# Patient Record
Sex: MALE | Race: WHITE | NOT HISPANIC OR LATINO | Employment: FULL TIME | URBAN - METROPOLITAN AREA
[De-identification: names, ages, dates, MRNs, and addresses within clinical notes are randomized per-mention and may not be internally consistent; named-entity substitution may affect disease eponyms.]

---

## 2017-01-06 ENCOUNTER — ALLSCRIPTS OFFICE VISIT (OUTPATIENT)
Dept: OTHER | Facility: OTHER | Age: 22
End: 2017-01-06

## 2017-04-04 ENCOUNTER — ALLSCRIPTS OFFICE VISIT (OUTPATIENT)
Dept: OTHER | Facility: OTHER | Age: 22
End: 2017-04-04

## 2017-04-12 ENCOUNTER — GENERIC CONVERSION - ENCOUNTER (OUTPATIENT)
Dept: OTHER | Facility: OTHER | Age: 22
End: 2017-04-12

## 2017-04-12 LAB
A/G RATIO (HISTORICAL): 1.8 (CALC) (ref 1–2.5)
ALBUMIN SERPL BCP-MCNC: 5 G/DL (ref 3.6–5.1)
ALP SERPL-CCNC: 66 U/L (ref 40–115)
ALT SERPL W P-5'-P-CCNC: 15 U/L (ref 9–46)
AST SERPL W P-5'-P-CCNC: 19 U/L (ref 10–40)
BASOPHILS # BLD AUTO: 0.2 %
BASOPHILS # BLD AUTO: 11 CELLS/UL (ref 0–200)
BILIRUB SERPL-MCNC: 0.5 MG/DL (ref 0.2–1.2)
BUN SERPL-MCNC: 12 MG/DL (ref 7–25)
BUN/CREA RATIO (HISTORICAL): NORMAL (CALC) (ref 6–22)
CALCIUM SERPL-MCNC: 9.8 MG/DL (ref 8.6–10.3)
CHLORIDE SERPL-SCNC: 102 MMOL/L (ref 98–110)
CHOLEST SERPL-MCNC: 144 MG/DL (ref 125–200)
CHOLEST/HDLC SERPL: 4 (CALC)
CO2 SERPL-SCNC: 29 MMOL/L (ref 20–31)
CREAT SERPL-MCNC: 0.96 MG/DL (ref 0.6–1.35)
DEPRECATED RDW RBC AUTO: 12.6 % (ref 11–15)
EOSINOPHIL # BLD AUTO: 0.7 %
EOSINOPHIL # BLD AUTO: 37 CELLS/UL (ref 15–500)
GAMMA GLOBULIN (HISTORICAL): 2.8 G/DL (CALC) (ref 1.9–3.7)
GLUCOSE (HISTORICAL): 85 MG/DL (ref 65–99)
HCT VFR BLD AUTO: 43.8 % (ref 38.5–50)
HDLC SERPL-MCNC: 36 MG/DL
HGB BLD-MCNC: 14.5 G/DL (ref 13.2–17.1)
LDL CHOLESTEROL DIRECT (HISTORICAL): 103 MG/DL
LYMPHOCYTES # BLD AUTO: 1134 CELLS/UL (ref 850–3900)
LYMPHOCYTES # BLD AUTO: 21.4 %
MCH RBC QN AUTO: 30.4 PG (ref 27–33)
MCHC RBC AUTO-ENTMCNC: 33 G/DL (ref 32–36)
MCV RBC AUTO: 92 FL (ref 80–100)
MONOCYTES # BLD AUTO: 355 CELLS/UL (ref 200–950)
MONOCYTES (HISTORICAL): 6.7 %
NEUTROPHILS # BLD AUTO: 3763 CELLS/UL (ref 1500–7800)
NEUTROPHILS # BLD AUTO: 71 %
NON-HDL-CHOL (CHOL-HDL) (HISTORICAL): 108 MG/DL (CALC)
PLATELET # BLD AUTO: 248 THOUSAND/UL (ref 140–400)
PMV BLD AUTO: 8.9 FL (ref 7.5–12.5)
POTASSIUM SERPL-SCNC: 4.4 MMOL/L (ref 3.5–5.3)
RBC # BLD AUTO: 4.76 MILLION/UL (ref 4.2–5.8)
SODIUM SERPL-SCNC: 138 MMOL/L (ref 135–146)
TOTAL PROTEIN (HISTORICAL): 7.8 G/DL (ref 6.1–8.1)
TRIGL SERPL-MCNC: 63 MG/DL
TSH SERPL DL<=0.05 MIU/L-ACNC: 1.61 MIU/L (ref 0.4–4.5)
WBC # BLD AUTO: 5.3 THOUSAND/UL (ref 3.8–10.8)

## 2017-11-28 ENCOUNTER — ALLSCRIPTS OFFICE VISIT (OUTPATIENT)
Dept: OTHER | Facility: OTHER | Age: 22
End: 2017-11-28

## 2018-01-14 VITALS
HEIGHT: 69 IN | TEMPERATURE: 98.6 F | OXYGEN SATURATION: 97 % | WEIGHT: 210.01 LBS | BODY MASS INDEX: 31.11 KG/M2 | RESPIRATION RATE: 16 BRPM | SYSTOLIC BLOOD PRESSURE: 122 MMHG | HEART RATE: 83 BPM | DIASTOLIC BLOOD PRESSURE: 74 MMHG

## 2018-01-15 VITALS
BODY MASS INDEX: 30.59 KG/M2 | WEIGHT: 206.5 LBS | TEMPERATURE: 99 F | DIASTOLIC BLOOD PRESSURE: 72 MMHG | HEIGHT: 69 IN | HEART RATE: 100 BPM | SYSTOLIC BLOOD PRESSURE: 124 MMHG | OXYGEN SATURATION: 96 %

## 2018-01-15 NOTE — RESULT NOTES
Verified Results  (Q) CBC (INCLUDES DIFF/PLT) (REFL) [id number] 12:19PM [employer]     Test Name Result Flag Reference   WHITE BLOOD CELL COUNT 5 3 Thousand/uL  3 8-10 8   RED BLOOD CELL COUNT 4 76 Million/uL  4 20-5 80   HEMOGLOBIN 14 5 g/dL  13 2-17 1   HEMATOCRIT 43 8 %  38 5-50 0   MCV 92 0 fL  80 0-100 0   MCH 30 4 pg  27 0-33 0   MCHC 33 0 g/dL  32 0-36 0   RDW 12 6 %  11 0-15 0   PLATELET COUNT 400 Thousand/uL  140-400   MPV 8 9 fL  7 5-12 5   ABSOLUTE NEUTROPHILS 3763 cells/uL  5602-2551   ABSOLUTE LYMPHOCYTES 1134 cells/uL  850-3900   ABSOLUTE MONOCYTES 355 cells/uL  200-950   ABSOLUTE EOSINOPHILS 37 cells/uL     ABSOLUTE BASOPHILS 11 cells/uL  0-200   NEUTROPHILS 71 0 %     LYMPHOCYTES 21 4 %     MONOCYTES 6 7 %     EOSINOPHILS 0 7 %     BASOPHILS 0 2 %       (Q) COMPREHENSIVE METABOLIC PANEL W/O eGFR [id number] 12:19PM [employer]     Test Name Result Flag Reference   GLUCOSE 85 mg/dL  65-99   Fasting reference interval   UREA NITROGEN (BUN) 12 mg/dL  7-25   CREATININE 0 96 mg/dL  0 60-1 35   BUN/CREATININE RATIO   3-01   NOT APPLICABLE (calc)   SODIUM 138 mmol/L  135-146   POTASSIUM 4 4 mmol/L  3 5-5 3   CHLORIDE 102 mmol/L     CARBON DIOXIDE 29 mmol/L  20-31   CALCIUM 9 8 mg/dL  8 6-10 3   PROTEIN, TOTAL 7 8 g/dL  6 1-8 1   ALBUMIN 5 0 g/dL  3 6-5 1   GLOBULIN 2 8 g/dL (calc)  1 9-3 7   ALBUMIN/GLOBULIN RATIO 1 8 (calc)  1 0-2 5   BILIRUBIN, TOTAL 0 5 mg/dL  0 2-1 2   ALKALINE PHOSPHATASE 66 U/L     AST 19 U/L  10-40   ALT 15 U/L  9-46     (Q) LIPID PANEL WITH DIRECT LDL [id number] 12:19PM [employer]     Test Name Result Flag Reference   CHOLESTEROL, TOTAL 144 mg/dL  125-200   HDL CHOLESTEROL 36 mg/dL L > OR = 40   TRIGLICERIDES 63 mg/dL  <619   DIRECT  mg/dL  <130   Desirable range <100 mg/dL for patients with CHD or  diabetes and <70 mg/dL for diabetic patients with  known heart disease     CHOL/HDLC RATIO 4 0 (calc)  < OR = 5 0   NON HDL CHOLESTEROL 108 mg/dL (calc)     Target for non-HDL cholesterol is 30 mg/dL higher than   LDL cholesterol target       (Q) TSH, 3RD GENERATION W/REFLEX TO FT4 [id number] 12:19PM [facility]   REPORT COMMENT:  FASTING:YES     Test Name Result Flag Reference   TSH W/REFLEX TO FT4 1 61 mIU/L  0 40-4 50

## 2018-01-17 NOTE — PROGRESS NOTES
Assessment    1  Encounter for preventive health examination (V70 0) (Z00 00)    Plan  PMH: History of influenza vaccination    · Fluzone Quadrivalent 0 5 ML Intramuscular Suspension Prefilled Syringe    Discussion/Summary  Impression: health maintenance visit  Currently, he eats an adequate diet  Prostate cancer screening: prostate cancer screening is managed by  Testicular cancer screening: testicular cancer screening is managed by  Colorectal cancer screening: colorectal cancer screening is not indicated  Advice and education were given regarding nutrition, weight loss and sunscreen use  F/U ONE YEAR  The treatment plan was reviewed with the patient/guardian  The patient/guardian understands and agrees with the treatment plan      Chief Complaint  WELLNESS      History of Present Illness  HM, Adult Male: The patient is being seen for a health maintenance evaluation  The last health maintenance visit was 1 year(s) ago  Social History: Household members include domestic partner, mother, father and 1 GRANDMOTHER  He is unmarried  Work status: working full time  The patient has never smoked cigarettes  He reports never drinking alcohol  He has never used illicit drugs  General Health: The patient's health since the last visit is described as good  He does not have regular dental visits  The patient brushes 1 time(s) a day, flosses occasionally and hasn't had a dental visit  Dental problems: no tooth pain and no caries  He denies vision problems  Vision care includes no recent eye examination  He denies hearing loss  Hearing is normal   He doesn't wear a hearing aid  Immunizations status: up to date  Lifestyle:  He does not have a healthy diet  He does not have any weight concerns  He exercises regularly  He exercises 6-7 times per week for 30-60 minutes per session  Exercise includes walking and aerobic conditioning  He does not use tobacco  The patient has never smoked cigarettes   He denies alcohol use  He reports never drinking alcohol  Alcohol concern: The patient has no concerns about alcohol abuse  He denies drug use  He has never used illicit drugs  Reproductive health:  the patient is sexually active  He is monogamous with a female partner  birth control is being practiced (his partner uses hormonal birth control )  Screening: Prostate cancer screening includes no previous evaluation  Testicular cancer screening includes no previous evaluation  Colorectal cancer screening includes no previous screening  Metabolic screening includes lipid profile performed 2017, glucose screening performed 2017, thyroid function test performed 2017 and no previous DEXA  Safety elements used: seat belt and smoke detector  HPI: HERE FOR WELLNESS EXAM       Review of Systems    Constitutional: No fever or chills, feels well, no tiredness, no recent weight gain or weight loss  Eyes: No complaints of eye pain, no red eyes, no discharge from eyes, no itchy eyes  ENT: no complaints of earache, no hearing loss, no nosebleeds, no nasal discharge, no sore throat, no hoarseness  Cardiovascular: No complaints of slow heart rate, no fast heart rate, no chest pain, no palpitations, no leg claudication, no lower extremity  Respiratory: No complaints of shortness of breath, no wheezing, no cough, no SOB on exertion, no orthopnea or PND  Gastrointestinal: No complaints of abdominal pain, no constipation, no nausea or vomiting, no diarrhea or bloody stools  Genitourinary: No complaints of dysuria, no incontinence, no hesitancy, no nocturia, no genital lesion, no testicular pain  Musculoskeletal: No complaints of arthralgia, no myalgias, no joint swelling or stiffness, no limb pain or swelling  Integumentary: No complaints of skin rash or skin lesions, no itching, no skin wound, no dry skin     Neurological: No compliants of headache, no confusion, no convulsions, no numbness or tingling, no dizziness or fainting, no limb weakness, no difficulty walking  Psychiatric: Is not suicidal, no sleep disturbances, no anxiety or depression, no change in personality, no emotional problems  Endocrine: No complaints of proptosis, no hot flashes, no muscle weakness, no erectile dysfunction, no deepening of the voice, no feelings of weakness  Hematologic/Lymphatic: No complaints of swollen glands, no swollen glands in the neck, does not bleed easily, no easy bruising  Active Problems    1  Allergic rhinitis (477 9) (J30 9)   2  Generalized anxiety disorder (300 02) (F41 1)    Past Medical History    · History of Irregular heartbeat (427 9) (I49 9)    Surgical History    · Denied: History Of Prior Surgery    Family History  Mother    · Family history of Anxiety   · Family history of Hysterectomy  Maternal Grandmother    · Family history of Coronary artery disease    Social History    · No alcohol use   · Non smoker (V49 89) (Z78 9)    Current Meds   1  Amoxicillin-Pot Clavulanate 875-125 MG Oral Tablet; TAKE 1 TABLET EVERY 12   HOURS DAILY; Therapy: 05UCZ8549 to (Evaluate:13Jan2017)  Requested for: 15HXB4424; Last   Rx:06Jan2017 Ordered   2  Cetirizine HCl - 5 MG Oral Tablet; TAKE 1 TABLET DAILY; Therapy: 88MMT1036 to (Evaluate:14Uhf5070)  Requested for: 16JIJ6232; Last   Rx:10May2017 Ordered   3  Fexofenadine HCl - 180 MG Oral Tablet; TAKE 1 TABLET DAILY; Therapy: 40Oct6013 to (Last Rx:72Exl8456)  Requested for: 82Ggo6823 Ordered   4  Levocetirizine Dihydrochloride 5 MG Oral Tablet; TAKE 1 TABLET DAILY; Therapy: 84FMS2551 to (Evaluate:12May2017)  Requested for: 12Apr2017; Last   Rx:12Apr2017 Ordered   5  SUMAtriptan Succinate 100 MG Oral Tablet; TAKE 1 TABLET AT ONSET OF MIGRAINE   HEADACHE  MAY REPEAT IN 2 HOURS IF NEEDED; Therapy: 11UBV3747 to (Evaluate:12May2017)  Requested for: 12Apr2017; Last   Rx:12Apr2017 Ordered    Allergies    1   No Known Drug Allergies    Vitals   Recorded: 26SAC3219 03:08PM   Temperature 98 2 F   Heart Rate 79   Respiration 20   Systolic 821   Diastolic 78   Height 5 ft 9 in   Weight 212 lb    BMI Calculated 31 31   BSA Calculated 2 12   O2 Saturation 98     Physical Exam    Constitutional   General appearance: No acute distress, well appearing and well nourished  Head and Face   Head and face: Normal     Eyes   Conjunctiva and lids: No erythema, swelling or discharge  Pupils and irises: Equal, round, reactive to light  Ears, Nose, Mouth, and Throat   External inspection of ears and nose: Normal     Otoscopic examination: Tympanic membranes translucent with normal light reflex  Canals patent without erythema  Nasal mucosa, septum, and turbinates: Normal without edema or erythema  Lips, teeth, and gums: Normal, good dentition  Oropharynx: Normal with no erythema, edema, exudate or lesions  Neck   Neck: Supple, symmetric, trachea midline, no masses  Thyroid: Normal, no thyromegaly  Pulmonary   Respiratory effort: No increased work of breathing or signs of respiratory distress  Auscultation of lungs: Clear to auscultation  Cardiovascular   Auscultation of heart: Normal rate and rhythm, normal S1 and S2, no murmurs  Examination of extremities for edema and/or varicosities: Normal     Chest   Chest: Normal     Abdomen   Abdomen: Non-tender, no masses  Liver and spleen: No hepatomegaly or splenomegaly  Examination for hernias: No hernias appreciated  Lymphatic   Palpation of lymph nodes in neck: No lymphadenopathy  Musculoskeletal   Gait and station: Normal     Inspection/palpation of digits and nails: Normal without clubbing or cyanosis  Inspection/palpation of joints, bones, and muscles: Normal     Range of motion: Normal     Stability: Normal     Muscle strength/tone: Normal     Skin   Skin and subcutaneous tissue: Normal without rashes or lesions  Palpation of skin and subcutaneous tissue: Normal turgor      Neurologic   Cranial nerves: Cranial nerves 2-12 intact  Reflexes: 2+ and symmetric  Sensation: No sensory loss  Psychiatric   Judgment and insight: Normal     Orientation to person, place and time: Normal     Recent and remote memory: Intact  Mood and affect: Normal        Results/Data  PHQ-2 Adult Depression Screening 28Nov2017 03:10PM User, [employer]     Test Name Result Flag Reference   PHQ-2 Adult Depression Score 0     Over the last two weeks, how often have you been bothered by any of the following problems?   Little interest or pleasure in doing things: Not at all - 0  Feeling down, depressed, or hopeless: Not at all - 0   PHQ-2 Adult Depression Screening Negative         Signatures   Electronically signed by : HEMANTH Mary; Nov 30 2017  6:52AM EST                       (Author)    Electronically signed by : MINESH Saucedo ; Nov 30 2017  8:28AM EST                       (Review)

## 2018-01-18 NOTE — PROGRESS NOTES
Assessment    1  Encounter for preventive health examination (V70 0) (Z00 00)   2  Screening, lipid (V77 91) (Z13 220)   3  Screening for thyroid disorder (V77 0) (Z13 29)    Plan  Health Maintenance, Generalized anxiety disorder, Screening for diabetes mellitus,  Screening for thyroid disorder, Screening, lipid    · (1) CBC/PLT/DIFF; Status:Active; Requested for:22Nov2016;    · (1) COMPREHENSIVE METABOLIC PANEL; Status:Active; Requested for:22Nov2016;    · (1) LIPID PANEL FASTING W DIRECT LDL REFLEX; Status:Active; Requested  for:22Nov2016;    · (1) TSH WITH FT4 REFLEX; Status:Active; Requested for:22Nov2016;   Need for influenza vaccination    · Fluzone Quadrivalent 0 5 ML Intramuscular Suspension Prefilled Syringe    Discussion/Summary  Impression: health maintenance visit  Currently, he eats a healthy diet  Prostate cancer screening: PSA is not indicated  Colorectal cancer screening: colorectal cancer screening is not indicated  The immunizations will be given as outlined in the orders  Advice and education were given regarding nutrition  Patient discussion: discussed with the patient  FLU VACCINE  LABS   F/U YEARLY  Chief Complaint  Patient presents today for a wellness      History of Present Illness  HM, Adult Male: The patient is being seen for a health maintenance evaluation  Social History: Household members include GIRLFRIEND  He is unmarried  Work status: working full time and occupation: [employer]   The patient has never smoked cigarettes  He reports never drinking alcohol  He has never used illicit drugs  General Health: The patient's health since the last visit is described as good  He does not have regular dental visits  He denies vision problems  He denies hearing loss  Immunizations status: not up to date  Lifestyle:  He consumes a diverse and healthy diet  He does not have any weight concerns  He exercises regularly  He does not use tobacco  He denies alcohol use   He denies drug use    Reproductive health:  the patient is sexually active  birth control is being practiced  Screening: Testicular cancer screening includes no previous evaluation  Colorectal cancer screening includes no previous screening  Metabolic screening includes uncertain timing of his last lipid profile, uncertain timing of his last glucose screening, uncertain timing of his last thyroid function test and no previous DEXA  Safety elements used: seat belt and smoke detector  HPI: [address]       Review of Systems    Constitutional: No fever or chills, feels well, no tiredness, no recent weight gain or weight loss  Eyes: No complaints of eye pain, no red eyes, no discharge from eyes, no itchy eyes  ENT: no complaints of earache, no hearing loss, no nosebleeds, no nasal discharge, no sore throat, no hoarseness  Cardiovascular: No complaints of slow heart rate, no fast heart rate, no chest pain, no palpitations, no leg claudication, no lower extremity  Respiratory: No complaints of shortness of breath, no wheezing, no cough, no SOB on exertion, no orthopnea or PND  Gastrointestinal: No complaints of abdominal pain, no constipation, no nausea or vomiting, no diarrhea or bloody stools  Genitourinary: No complaints of dysuria, no incontinence, no hesitancy, no nocturia, no genital lesion, no testicular pain  Musculoskeletal: No complaints of arthralgia, no myalgias, no joint swelling or stiffness, no limb pain or swelling  Integumentary: No complaints of skin rash or skin lesions, no itching, no skin wound, no dry skin  Neurological: No compliants of headache, no confusion, no convulsions, no numbness or tingling, no dizziness or fainting, no limb weakness, no difficulty walking  Psychiatric: Is not suicidal, no sleep disturbances, no anxiety or depression, no change in personality, no emotional problems     Endocrine: No complaints of proptosis, no hot flashes, no muscle weakness, no erectile dysfunction, no deepening of the voice, no feelings of weakness  Hematologic/Lymphatic: No complaints of swollen glands, no swollen glands in the neck, does not bleed easily, no easy bruising  Active Problems    1  Generalized anxiety disorder (300 02) (F41 1)   2  Need for influenza vaccination (V04 81) (Z23)    Past Medical History    · History of Irregular heartbeat (427 9) (I49 9)    Surgical History    · Denied: History Of Prior Surgery    Family History  Mother    · Family history of Anxiety   · Family history of Hysterectomy  Maternal Grandmother    · Family history of Coronary artery disease    Social History    · No alcohol use   · Non smoker (V49 89) (Z78 9)    Current Meds   1  Fexofenadine HCl - 180 MG Oral Tablet; TAKE 1 TABLET DAILY; Therapy: 11Fko2126 to (Last Rx:42Bnf6051)  Requested for: 30Iln9418 Ordered    Allergies    1  No Known Drug Allergies    Vitals   Recorded: 22Nov2016 05:38PM   Temperature 98 6 F   Heart Rate 73   Respiration 16   Systolic 988   Diastolic 72   Height 5 ft 9 in   Weight 200 lb 0 8 oz   BMI Calculated 29 54   BSA Calculated 2 07   O2 Saturation 96     Physical Exam    Constitutional   General appearance: No acute distress, well appearing and well nourished  Head and Face   Head and face: Normal     Eyes   Conjunctiva and lids: No erythema, swelling or discharge  Pupils and irises: Equal, round, reactive to light  Ears, Nose, Mouth, and Throat   External inspection of ears and nose: Normal     Otoscopic examination: Tympanic membranes translucent with normal light reflex  Canals patent without erythema  Nasal mucosa, septum, and turbinates: Normal without edema or erythema  Lips, teeth, and gums: Normal, good dentition  Oropharynx: Normal with no erythema, edema, exudate or lesions  Neck   Neck: Supple, symmetric, trachea midline, no masses  Thyroid: Normal, no thyromegaly      Pulmonary   Respiratory effort: No increased work of breathing or signs of respiratory distress  Auscultation of lungs: Clear to auscultation  Cardiovascular   Auscultation of heart: Normal rate and rhythm, normal S1 and S2, no murmurs  Examination of extremities for edema and/or varicosities: Normal     Abdomen   Abdomen: Non-tender, no masses  Liver and spleen: No hepatomegaly or splenomegaly  Lymphatic   Palpation of lymph nodes in neck: No lymphadenopathy  Musculoskeletal   Gait and station: Normal     Inspection/palpation of digits and nails: Normal without clubbing or cyanosis  Inspection/palpation of joints, bones, and muscles: Normal     Range of motion: Normal     Stability: Normal     Muscle strength/tone: Normal     Skin   Skin and subcutaneous tissue: Normal without rashes or lesions  Palpation of skin and subcutaneous tissue: Normal turgor  Neurologic   Cranial nerves: Cranial nerves 2-12 intact  Cortical function: Normal mental status  Reflexes: 2+ and symmetric  Sensation: No sensory loss  Coordination: Normal finger to nose and heel to shin  Psychiatric   Judgment and insight: Normal     Orientation to person, place and time: Normal     Recent and remote memory: Intact      Mood and affect: Normal        Future Appointments    Date/Time Provider Specialty Site   11/28/2017 03:00 PM Donnie Oneil [facility]     Signatures   Electronically signed by : Donnie Porras; Nov 24 2016  8:47PM EST                       (Author)    Electronically signed by : MINESH Byrnes ; Nov 24 2016 11:09PM EST                       (Review)

## 2018-01-22 VITALS
WEIGHT: 212 LBS | TEMPERATURE: 98.2 F | DIASTOLIC BLOOD PRESSURE: 78 MMHG | BODY MASS INDEX: 31.4 KG/M2 | RESPIRATION RATE: 20 BRPM | HEART RATE: 79 BPM | SYSTOLIC BLOOD PRESSURE: 118 MMHG | OXYGEN SATURATION: 98 % | HEIGHT: 69 IN

## 2018-06-27 ENCOUNTER — OFFICE VISIT (OUTPATIENT)
Dept: INTERNAL MEDICINE CLINIC | Facility: CLINIC | Age: 23
End: 2018-06-27

## 2018-06-27 VITALS
BODY MASS INDEX: 31.5 KG/M2 | DIASTOLIC BLOOD PRESSURE: 90 MMHG | HEIGHT: 70 IN | HEART RATE: 86 BPM | OXYGEN SATURATION: 99 % | WEIGHT: 220 LBS | SYSTOLIC BLOOD PRESSURE: 130 MMHG

## 2018-06-27 DIAGNOSIS — F32.A DEPRESSION, UNSPECIFIED DEPRESSION TYPE: Primary | ICD-10-CM

## 2018-06-27 DIAGNOSIS — F41.1 GENERALIZED ANXIETY DISORDER: Primary | ICD-10-CM

## 2018-06-27 PROCEDURE — 99213 OFFICE O/P EST LOW 20 MIN: CPT | Performed by: NURSE PRACTITIONER

## 2018-06-27 RX ORDER — ALBUTEROL SULFATE 90 UG/1
AEROSOL, METERED RESPIRATORY (INHALATION)
COMMUNITY
Start: 2014-02-11 | End: 2022-01-31 | Stop reason: ALTCHOICE

## 2018-06-27 RX ORDER — SUMATRIPTAN 100 MG/1
1 TABLET, FILM COATED ORAL
COMMUNITY
Start: 2017-04-04 | End: 2021-04-12 | Stop reason: HOSPADM

## 2018-06-27 RX ORDER — AMOXICILLIN AND CLAVULANATE POTASSIUM 875; 125 MG/1; MG/1
1 TABLET, FILM COATED ORAL EVERY 12 HOURS
COMMUNITY
Start: 2017-01-06 | End: 2018-06-27 | Stop reason: ALTCHOICE

## 2018-06-27 RX ORDER — LORATADINE AND PSEUDOEPHEDRINE 10; 240 MG/1; MG/1
TABLET, EXTENDED RELEASE ORAL EVERY 24 HOURS
COMMUNITY
Start: 2014-02-12 | End: 2018-06-27 | Stop reason: SDUPTHER

## 2018-06-27 RX ORDER — LEVOCETIRIZINE DIHYDROCHLORIDE 5 MG/1
1 TABLET, FILM COATED ORAL DAILY
COMMUNITY
Start: 2017-04-04 | End: 2022-01-31 | Stop reason: ALTCHOICE

## 2018-06-27 RX ORDER — CETIRIZINE HYDROCHLORIDE 5 MG/1
1 TABLET ORAL DAILY
COMMUNITY
Start: 2017-05-10 | End: 2018-06-27 | Stop reason: SDUPTHER

## 2018-06-27 RX ORDER — FEXOFENADINE HCL AND PSEUDOEPHEDRINE HCI 180; 240 MG/1; MG/1
TABLET, EXTENDED RELEASE ORAL EVERY 24 HOURS
COMMUNITY
Start: 2014-02-11 | End: 2018-06-27 | Stop reason: SDUPTHER

## 2018-06-27 RX ORDER — SERTRALINE HYDROCHLORIDE 25 MG/1
TABLET, FILM COATED ORAL
Qty: 60 TABLET | Refills: 0 | Status: SHIPPED | OUTPATIENT
Start: 2018-06-27 | End: 2022-01-31 | Stop reason: ALTCHOICE

## 2018-06-27 NOTE — PROGRESS NOTES
Assessment/Plan:     Diagnoses and all orders for this visit:    Generalized anxiety disorder  Comments:  discussed side effects and directions for taking medication  call for any worsening depression/suicidal thoughts  offered counseing with Laurel cervantes in 1 month  Orders:  -     sertraline (ZOLOFT) 25 mg tablet; Take 1 tablet at bedtime for 1 week then increased to 2 tablets daily at bedtime          Subjective:      Patient ID: Lacey Broussard is a 26 y o  male  Here for anxiety   He has a history of this in the past with increased stressors  He's been feeling more stressed for about 1 week since starting a new position at his job  He is working more house  Feeling panicky, feels like his heart is racing at times  Eating and drinking ok  Wakes up a few hours before work stressing about work  Denies any suicidal ideation     He recently got a letter to serve jury duty in [address] [place] [address]  This has increased his anxiety due to car issues and having to take time away from work  He has been having anxiety attacks since he found out  He was requesting to be excused from jury duty due to anxiety  The following portions of the patient's history were reviewed and updated as appropriate: allergies, current medications, past family history, past medical history, past social history, past surgical history and problem list     Review of Systems   Constitutional: Negative  Respiratory: Negative  Cardiovascular: Negative  Gastrointestinal: Negative  Neurological: Negative  Psychiatric/Behavioral: Positive for sleep disturbance  Negative for agitation, self-injury and suicidal ideas  The patient is nervous/anxious  Objective:      /90 (BP Location: Left arm, Patient Position: Sitting, Cuff Size: Large)   Pulse 86   Ht 5' 10" (1 778 m)   Wt 99 8 kg (220 lb)   SpO2 99%   BMI 31 57 kg/m²          Physical Exam   Constitutional: He is oriented to person, place, and time   He appears well-developed and well-nourished  Cardiovascular: Normal rate, regular rhythm and normal heart sounds  Pulmonary/Chest: Effort normal and breath sounds normal    Neurological: He is alert and oriented to person, place, and time  Psychiatric: His mood appears anxious  Vitals reviewed

## 2018-06-27 NOTE — LETTER
Date: 6/27/2018    To whom it may concern: This is to certify that Marco Spearshung has been under my care for the following diagnosis: generalized anxiety disorder/panic disorder  I feel that he is unable to serve on Jury Duty at this time for the above mentioned medical reasons                    Sincerely,         Mai Burk

## 2019-08-26 ENCOUNTER — APPOINTMENT (OUTPATIENT)
Dept: LAB | Facility: HOSPITAL | Age: 24
End: 2019-08-26

## 2019-08-26 ENCOUNTER — TRANSCRIBE ORDERS (OUTPATIENT)
Dept: LAB | Facility: HOSPITAL | Age: 24
End: 2019-08-26

## 2019-08-26 DIAGNOSIS — Z00.8 HEALTH EXAMINATION IN POPULATION SURVEY: Primary | ICD-10-CM

## 2019-08-26 DIAGNOSIS — Z00.8 HEALTH EXAMINATION IN POPULATION SURVEY: ICD-10-CM

## 2019-08-26 LAB
CHOLEST SERPL-MCNC: 133 MG/DL (ref 50–200)
EST. AVERAGE GLUCOSE BLD GHB EST-MCNC: 111 MG/DL
HBA1C MFR BLD: 5.5 % (ref 4.2–6.3)
HDLC SERPL-MCNC: 38 MG/DL (ref 40–60)
LDLC SERPL CALC-MCNC: 89 MG/DL (ref 0–100)
NONHDLC SERPL-MCNC: 95 MG/DL
TRIGL SERPL-MCNC: 30 MG/DL

## 2019-08-26 PROCEDURE — 83036 HEMOGLOBIN GLYCOSYLATED A1C: CPT

## 2019-08-26 PROCEDURE — 80061 LIPID PANEL: CPT

## 2019-08-26 PROCEDURE — 36415 COLL VENOUS BLD VENIPUNCTURE: CPT

## 2020-06-08 ENCOUNTER — TELEMEDICINE (OUTPATIENT)
Dept: INTERNAL MEDICINE CLINIC | Age: 25
End: 2020-06-08
Payer: COMMERCIAL

## 2020-06-08 ENCOUNTER — DOCUMENTATION (OUTPATIENT)
Dept: URGENT CARE | Age: 25
End: 2020-06-08

## 2020-06-08 VITALS
TEMPERATURE: 98.4 F | DIASTOLIC BLOOD PRESSURE: 64 MMHG | BODY MASS INDEX: 31.57 KG/M2 | WEIGHT: 220 LBS | SYSTOLIC BLOOD PRESSURE: 113 MMHG | HEART RATE: 76 BPM

## 2020-06-08 DIAGNOSIS — Z20.822 EXPOSURE TO COVID-19 VIRUS: ICD-10-CM

## 2020-06-08 DIAGNOSIS — B34.9 ACUTE VIRAL SYNDROME: Primary | ICD-10-CM

## 2020-06-08 DIAGNOSIS — Z20.828 EXPOSURE TO SARS-ASSOCIATED CORONAVIRUS: ICD-10-CM

## 2020-06-08 PROCEDURE — U0003 INFECTIOUS AGENT DETECTION BY NUCLEIC ACID (DNA OR RNA); SEVERE ACUTE RESPIRATORY SYNDROME CORONAVIRUS 2 (SARS-COV-2) (CORONAVIRUS DISEASE [COVID-19]), AMPLIFIED PROBE TECHNIQUE, MAKING USE OF HIGH THROUGHPUT TECHNOLOGIES AS DESCRIBED BY CMS-2020-01-R: HCPCS

## 2020-06-08 PROCEDURE — 99214 OFFICE O/P EST MOD 30 MIN: CPT | Performed by: INTERNAL MEDICINE

## 2020-06-08 RX ORDER — FEXOFENADINE HCL 180 MG/1
180 TABLET ORAL DAILY
COMMUNITY

## 2020-06-09 LAB — SARS-COV-2 RNA SPEC QL NAA+PROBE: NOT DETECTED

## 2020-06-10 ENCOUNTER — TELEPHONE (OUTPATIENT)
Dept: INTERNAL MEDICINE CLINIC | Age: 25
End: 2020-06-10

## 2021-01-28 ENCOUNTER — TELEMEDICINE (OUTPATIENT)
Dept: INTERNAL MEDICINE CLINIC | Age: 26
End: 2021-01-28
Payer: COMMERCIAL

## 2021-01-28 ENCOUNTER — TELEPHONE (OUTPATIENT)
Dept: INTERNAL MEDICINE CLINIC | Age: 26
End: 2021-01-28

## 2021-01-28 DIAGNOSIS — R11.2 NON-INTRACTABLE VOMITING WITH NAUSEA, UNSPECIFIED VOMITING TYPE: ICD-10-CM

## 2021-01-28 DIAGNOSIS — B34.9 ACUTE VIRAL SYNDROME: Primary | ICD-10-CM

## 2021-01-28 DIAGNOSIS — Z20.822 EXPOSURE TO COVID-19 VIRUS: ICD-10-CM

## 2021-01-28 PROCEDURE — 99213 OFFICE O/P EST LOW 20 MIN: CPT | Performed by: PHYSICIAN ASSISTANT

## 2021-01-28 RX ORDER — ONDANSETRON 4 MG/1
4 TABLET, FILM COATED ORAL EVERY 8 HOURS PRN
Qty: 20 TABLET | Refills: 0 | Status: SHIPPED | OUTPATIENT
Start: 2021-01-28 | End: 2022-01-31 | Stop reason: ALTCHOICE

## 2021-01-28 NOTE — LETTER
January 28, 2021     Patient: Ruben Michaud   YOB: 1995   Date of Visit: 1/28/2021       To Whom it May Concern:    Ruben Michaud is under my professional care  He was seen in my office on 1/28/2021  He may return to work on 2/1/21       If you have any questions or concerns, please don't hesitate to call           Sincerely,          Keaton De La Cruz PA-C        CC: No Recipients

## 2021-01-28 NOTE — PROGRESS NOTES
Virtual Regular Visit      Assessment/Plan:    Problem List Items Addressed This Visit        Other    Acute viral syndrome - Primary    Exposure to COVID-19 virus    Relevant Orders    Novel Coronavirus (Covid-19),PCR [facility] - Collected at Mobile [facility] or [facility]      Other Visit Diagnoses     Non-intractable vomiting with nausea, unspecified vomiting type        zofran prn  if continues needs to go to ER for full eval     Relevant Medications    ondansetron (ZOFRAN) 4 mg tablet      pt instructed to go to ER for severe headache, unable to control / intractable vomiting  Pt instructed if this continues through tomorrow would recommend eval in ER for labs and imaging  Pt instructed to go for covid testing today  May use zofran/pepto bismol prn  Increase fluids, gatorade, brat diet only x 48 hrs  May use ibuprofen prn h/a but only with food   Will write work note through 2/1, may return at that time provided covid negative and pt is asx         Reason for visit is   Chief Complaint   Patient presents with    Headache     bad headache- started dqy 3 - pt used ibuprofen     Nausea    Vomiting    Diarrhea    Virtual Regular Visit        Encounter provider Lois Skelton PA-C    Provider located at [address] [place] [phone]      Recent Visits  No visits were found meeting these conditions  Showing recent visits within past 7 days and meeting all other requirements     Today's Visits  Date Type Provider Dept   01/28/21 Telemedicine Lois Skelton PA-C [facility]   Showing today's visits and meeting all other requirements     Future Appointments  No visits were found meeting these conditions  Showing future appointments within next 150 days and meeting all other requirements        The patient was identified by name and date of birth   Adilson Boyle was informed that this is a telemedicine visit and that the visit is being conducted through [facility] and patient was informed that this is a secure, HIPAA-compliant platform  He agrees to proceed     My office door was closed  No one else was in the room  He acknowledged consent and understanding of privacy and security of the video platform  The patient has agreed to participate and understands they can discontinue the visit at any time  Patient is aware this is a billable service  Subjective  Bia Curry is a 26 y o  male with hx of flaquita  25 y/o male with hx of   Pt with continued nausea and multiple episodes of vomiting over the past 2 days, pt following brat diet  Pt is hydrating  Diarrhea since Tuesday, 2 episodes of loose stools / watery stools  Pt c/o abdominal aching sensation  Pt denies known exposure to covid  Past Medical History:   Diagnosis Date    Irregular heartbeat     Last Assessed [id number]       History reviewed  No pertinent surgical history  Current Outpatient Medications   Medication Sig Dispense Refill    albuterol (PROAIR HFA) 90 mcg/act inhaler prn      fexofenadine (ALLEGRA) 180 MG tablet Take 180 mg by mouth daily      levocetirizine (XYZAL) 5 MG tablet Take 1 tablet by mouth daily      ondansetron (ZOFRAN) 4 mg tablet Take 1 tablet (4 mg total) by mouth every 8 (eight) hours as needed for nausea or vomiting 20 tablet 0    sertraline (ZOLOFT) 25 mg tablet Take 1 tablet at bedtime for 1 week then increased to 2 tablets daily at bedtime (Patient not taking: Reported on 6/8/2020) 60 tablet 0    sertraline (ZOLOFT) 50 mg tablet Take 0 5 tablet for seven days  On day eight increase to a full tablet  (Patient not taking: Reported on 6/8/2020) 30 tablet 0    SUMAtriptan (IMITREX) 100 mg tablet Take 1 tablet by mouth       No current facility-administered medications for this visit           Allergies   Allergen Reactions    Other Itching     Seasonal allergies- eyes and nose itch, stuffy , wheezing Review of Systems   Constitutional: Positive for appetite change, chills, fatigue and fever  Gastrointestinal: Positive for diarrhea, nausea and vomiting  Skin: Negative for rash  Neurological: Positive for headaches  Negative for light-headedness  Psychiatric/Behavioral: Negative for sleep disturbance  The patient is not nervous/anxious  Video Exam    There were no vitals filed for this visit  Physical Exam  Vitals signs reviewed  Constitutional:       General: He is not in acute distress  HENT:      Head: Normocephalic and atraumatic  Eyes:      General:         Right eye: No discharge  Left eye: No discharge  Conjunctiva/sclera: Conjunctivae normal    Pulmonary:      Effort: Pulmonary effort is normal  No respiratory distress  Skin:     Findings: No rash  Neurological:      General: No focal deficit present  Mental Status: He is alert  I spent 15 minutes directly with the patient during this visit      [address] [place] [address] acknowledges that he has consented to an online visit or consultation  He understands that the online visit is based solely on information provided by him, and that, in the absence of a face-to-face physical evaluation by the physician, the diagnosis he receives is both limited and provisional in terms of accuracy and completeness  This is not intended to replace a full medical face-to-face evaluation by the physician  [facility] understands and accepts these terms

## 2021-04-12 ENCOUNTER — TELEPHONE (OUTPATIENT)
Dept: INTERNAL MEDICINE CLINIC | Facility: CLINIC | Age: 26
End: 2021-04-12

## 2021-04-12 ENCOUNTER — TELEMEDICINE (OUTPATIENT)
Dept: INTERNAL MEDICINE CLINIC | Facility: CLINIC | Age: 26
End: 2021-04-12
Payer: COMMERCIAL

## 2021-04-12 DIAGNOSIS — J30.2 SEASONAL ALLERGIC RHINITIS, UNSPECIFIED TRIGGER: Primary | ICD-10-CM

## 2021-04-12 DIAGNOSIS — G43.909 MIGRAINE WITHOUT STATUS MIGRAINOSUS, NOT INTRACTABLE, UNSPECIFIED MIGRAINE TYPE: ICD-10-CM

## 2021-04-12 PROCEDURE — 99213 OFFICE O/P EST LOW 20 MIN: CPT | Performed by: INTERNAL MEDICINE

## 2021-04-12 NOTE — PROGRESS NOTES
Virtual Regular Visit      Assessment/Plan:    1  Seasonal allergic rhinitis  Advised to use over-the-counter Flonase nasal spray  Continue with Allegra  2  Chronic migraine headache  Triggering factor probably right now is allergies due to seasonal allergic rhinitis  He tried Imitrex in the past not able to tolerate without any side effect  Now would prefer just Motrin as needed  Problem List Items Addressed This Visit        Respiratory    Seasonal allergic rhinitis - Primary       Cardiovascular and Mediastinum    Migraine without status migrainosus, not intractable          BMI Counseling: BMI was not able to be calculated due to patient refusing height and/or weight  Reason for visit is   Chief Complaint   Patient presents with    Headache - Recurrent or Known Dx Migraines     seem to think its a seasonal change  off and on since friday but more regular,  usually on top of head but its on top left    health maintenance     bmi adult, annual exam        Encounter provider Gaurav Reyes MD    Provider located at [address] [place] [address] [address] [facility] [phone]      Recent Visits  No visits were found meeting these conditions  Showing recent visits within past 7 days and meeting all other requirements     Today's Visits  Date Type Provider Dept   04/12/21 Telemedicine Gaurav Reyes MD [facility]   Showing today's visits and meeting all other requirements     Future Appointments  No visits were found meeting these conditions  Showing future appointments within next 150 days and meeting all other requirements        The patient was identified by name and date of birth  Fairgavin Valiente was informed that this is a telemedicine visit and that the visit is being conducted through [address] [place] [address] and patient was informed that this is not a secure, HIPAA-compliant platform  He agrees to proceed     My office door was closed  No one else was in the room  He acknowledged consent and understanding of privacy and security of the video platform  The patient has agreed to participate and understands they can discontinue the visit at any time  Patient is aware this is a billable service  Subjective  Margi Lord is a 23 y o  male complaining of stuffy nose and headache  Also complaining of pressure in the ears   Complaining of a left-sided headache  Does give a history of migraine headache in the past and he took Imitrex last year was not able to tolerate side effect  Now I think the allergies are triggering his headache  Presently he just using Allegra  Past Medical History:   Diagnosis Date    Irregular heartbeat     Last Assessed [id number]       History reviewed  No pertinent surgical history  Current Outpatient Medications   Medication Sig Dispense Refill    albuterol (PROAIR HFA) 90 mcg/act inhaler prn      fexofenadine (ALLEGRA) 180 MG tablet Take 180 mg by mouth daily      ondansetron (ZOFRAN) 4 mg tablet Take 1 tablet (4 mg total) by mouth every 8 (eight) hours as needed for nausea or vomiting 20 tablet 0    levocetirizine (XYZAL) 5 MG tablet Take 1 tablet by mouth daily      sertraline (ZOLOFT) 25 mg tablet Take 1 tablet at bedtime for 1 week then increased to 2 tablets daily at bedtime (Patient not taking: Reported on 6/8/2020) 60 tablet 0    sertraline (ZOLOFT) 50 mg tablet Take 0 5 tablet for seven days  On day eight increase to a full tablet  (Patient not taking: Reported on 6/8/2020) 30 tablet 0     No current facility-administered medications for this visit  Allergies   Allergen Reactions    Other Itching     Seasonal allergies- eyes and nose itch, stuffy , wheezing       Review of Systems   Constitutional: Negative for fatigue and fever  HENT: Positive for congestion, postnasal drip and sinus pressure   Negative for ear discharge, ear pain, sore throat, tinnitus and trouble swallowing  Eyes: Negative for discharge, itching and visual disturbance  Respiratory: Negative for cough and shortness of breath  Cardiovascular: Negative for chest pain and palpitations  Gastrointestinal: Negative for abdominal pain, diarrhea, nausea and vomiting  Endocrine: Negative for cold intolerance and polyuria  Genitourinary: Negative for difficulty urinating, dysuria and urgency  Musculoskeletal: Negative for arthralgias and neck pain  Skin: Negative for rash  Allergic/Immunologic: Negative for environmental allergies  Neurological: Positive for headaches  Negative for dizziness and weakness  Psychiatric/Behavioral: The patient is not nervous/anxious  Video Exam    There were no vitals filed for this visit  Physical Exam  Nursing note reviewed  Constitutional:       Appearance: He is not ill-appearing or diaphoretic  HENT:      Right Ear: There is no impacted cerumen  Left Ear: There is no impacted cerumen  Mouth/Throat:      Pharynx: No oropharyngeal exudate or posterior oropharyngeal erythema  Eyes:      Conjunctiva/sclera: Conjunctivae normal    Neck:      Musculoskeletal: Normal range of motion  Cardiovascular:      Comments: No pedal edema  Pulmonary:      Effort: Pulmonary effort is normal  No respiratory distress  Abdominal:      General: There is no distension  Musculoskeletal:      Right lower leg: No edema  Left lower leg: No edema  Skin:     General: Skin is dry  Neurological:      Mental Status: He is oriented to person, place, and time  Psychiatric:         Mood and Affect: Mood normal          Behavior: Behavior normal           I spent 15 minutes directly with the patient during this visit      [address] [place] [address] acknowledges that he has consented to an online visit or consultation   He understands that the online visit is based solely on information provided by him, and that, in the absence of a face-to-face physical evaluation by the physician, the diagnosis he receives is both limited and provisional in terms of accuracy and completeness  This is not intended to replace a full medical face-to-face evaluation by the physician  [facility] understands and accepts these terms

## 2021-06-17 ENCOUNTER — APPOINTMENT (EMERGENCY)
Dept: CT IMAGING | Facility: HOSPITAL | Age: 26
End: 2021-06-17
Payer: COMMERCIAL

## 2021-06-17 ENCOUNTER — APPOINTMENT (EMERGENCY)
Dept: RADIOLOGY | Facility: HOSPITAL | Age: 26
End: 2021-06-17
Attending: EMERGENCY MEDICINE
Payer: COMMERCIAL

## 2021-06-17 ENCOUNTER — HOSPITAL ENCOUNTER (EMERGENCY)
Facility: HOSPITAL | Age: 26
Discharge: HOME | End: 2021-06-17
Attending: EMERGENCY MEDICINE | Admitting: EMERGENCY MEDICINE
Payer: COMMERCIAL

## 2021-06-17 VITALS
WEIGHT: 220 LBS | RESPIRATION RATE: 18 BRPM | TEMPERATURE: 98.5 F | OXYGEN SATURATION: 98 % | SYSTOLIC BLOOD PRESSURE: 143 MMHG | BODY MASS INDEX: 31.5 KG/M2 | HEART RATE: 82 BPM | HEIGHT: 70 IN | DIASTOLIC BLOOD PRESSURE: 74 MMHG

## 2021-06-17 DIAGNOSIS — T07.XXXA MULTIPLE CONTUSIONS: ICD-10-CM

## 2021-06-17 DIAGNOSIS — T07.XXXA ABRASIONS OF MULTIPLE SITES: ICD-10-CM

## 2021-06-17 DIAGNOSIS — S09.90XA MINOR HEAD INJURY WITHOUT LOSS OF CONSCIOUSNESS, INITIAL ENCOUNTER: ICD-10-CM

## 2021-06-17 DIAGNOSIS — M79.622 LEFT UPPER ARM PAIN: ICD-10-CM

## 2021-06-17 DIAGNOSIS — M54.2 NECK PAIN: ICD-10-CM
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
## 2021-06-17 LAB
ALBUMIN SERPL BCP-MCNC: 4.4 G/DL (ref 3.5–5)
ALP SERPL-CCNC: 57 U/L (ref 46–116)
ALT SERPL W P-5'-P-CCNC: 26 U/L (ref 12–78)
ANION GAP SERPL CALCULATED.3IONS-SCNC: 11 MMOL/L (ref 4–13)
AST SERPL W P-5'-P-CCNC: 23 U/L (ref 5–45)
BASOPHILS # BLD AUTO: 0.02 THOUSANDS/ΜL (ref 0–0.1)
BASOPHILS NFR BLD AUTO: 0 % (ref 0–1)
BILIRUB SERPL-MCNC: 0.4 MG/DL (ref 0.2–1)
BUN SERPL-MCNC: 10 MG/DL (ref 5–25)
CALCIUM SERPL-MCNC: 8.9 MG/DL (ref 8.3–10.1)
CHLORIDE SERPL-SCNC: 107 MMOL/L (ref 100–108)
CO2 SERPL-SCNC: 26 MMOL/L (ref 21–32)
CREAT SERPL-MCNC: 1.04 MG/DL (ref 0.6–1.3)
EOSINOPHIL # BLD AUTO: 0.14 THOUSAND/ΜL (ref 0–0.61)
EOSINOPHIL NFR BLD AUTO: 3 % (ref 0–6)
ERYTHROCYTE [DISTWIDTH] IN BLOOD BY AUTOMATED COUNT: 11.9 % (ref 11.6–15.1)
GFR SERPL CREATININE-BSD FRML MDRD: 99 ML/MIN/1.73SQ M
GLUCOSE SERPL-MCNC: 102 MG/DL (ref 65–140)
HCT VFR BLD AUTO: 45.1 % (ref 36.5–49.3)
HGB BLD-MCNC: 15.2 G/DL (ref 12–17)
IMM GRANULOCYTES # BLD AUTO: 0.08 THOUSAND/UL (ref 0–0.2)
IMM GRANULOCYTES NFR BLD AUTO: 2 % (ref 0–2)
LYMPHOCYTES # BLD AUTO: 1.97 THOUSANDS/ΜL (ref 0.6–4.47)
LYMPHOCYTES NFR BLD AUTO: 36 % (ref 14–44)
MCH RBC QN AUTO: 30.8 PG (ref 26.8–34.3)
MCHC RBC AUTO-ENTMCNC: 33.7 G/DL (ref 31.4–37.4)
MCV RBC AUTO: 92 FL (ref 82–98)
MONOCYTES # BLD AUTO: 0.41 THOUSAND/ΜL (ref 0.17–1.22)
MONOCYTES NFR BLD AUTO: 8 % (ref 4–12)
NEUTROPHILS # BLD AUTO: 2.83 THOUSANDS/ΜL (ref 1.85–7.62)
NEUTS SEG NFR BLD AUTO: 51 % (ref 43–75)
NRBC BLD AUTO-RTO: 0 /100 WBCS
PLATELET # BLD AUTO: 245 THOUSANDS/UL (ref 149–390)
PMV BLD AUTO: 10.1 FL (ref 8.9–12.7)
POTASSIUM SERPL-SCNC: 4 MMOL/L (ref 3.5–5.3)
PROT SERPL-MCNC: 8 G/DL (ref 6.4–8.2)
RBC # BLD AUTO: 4.93 MILLION/UL (ref 3.88–5.62)
SODIUM SERPL-SCNC: 144 MMOL/L (ref 136–145)
WBC # BLD AUTO: 5.45 THOUSAND/UL (ref 4.31–10.16)

## 2021-06-17 PROCEDURE — 72125 CT NECK SPINE W/O DYE: CPT

## 2021-06-17 PROCEDURE — 96360 HYDRATION IV INFUSION INIT: CPT

## 2021-06-17 PROCEDURE — 90471 IMMUNIZATION ADMIN: CPT

## 2021-06-17 PROCEDURE — 96374 THER/PROPH/DIAG INJ IV PUSH: CPT

## 2021-06-17 PROCEDURE — 80053 COMPREHEN METABOLIC PANEL: CPT | Performed by: EMERGENCY MEDICINE

## 2021-06-17 PROCEDURE — G1004 CDSM NDSC: HCPCS

## 2021-06-17 PROCEDURE — 70498 CT ANGIOGRAPHY NECK: CPT

## 2021-06-17 PROCEDURE — 90715 TDAP VACCINE 7 YRS/> IM: CPT | Performed by: EMERGENCY MEDICINE

## 2021-06-17 PROCEDURE — 93005 ELECTROCARDIOGRAM TRACING: CPT

## 2021-06-17 PROCEDURE — 73030 X-RAY EXAM OF SHOULDER: CPT

## 2021-06-17 PROCEDURE — 85025 COMPLETE CBC W/AUTO DIFF WBC: CPT | Performed by: EMERGENCY MEDICINE

## 2021-06-17 PROCEDURE — 99285 EMERGENCY DEPT VISIT HI MDM: CPT

## 2021-06-17 PROCEDURE — 71260 CT THORAX DX C+: CPT

## 2021-06-17 PROCEDURE — 73060 X-RAY EXAM OF HUMERUS: CPT

## 2021-06-17 PROCEDURE — 36415 COLL VENOUS BLD VENIPUNCTURE: CPT | Performed by: EMERGENCY MEDICINE

## 2021-06-17 PROCEDURE — 99285 EMERGENCY DEPT VISIT HI MDM: CPT | Performed by: EMERGENCY MEDICINE

## 2021-06-17 PROCEDURE — 71045 X-RAY EXAM CHEST 1 VIEW: CPT

## 2021-06-17 PROCEDURE — 74177 CT ABD & PELVIS W/CONTRAST: CPT

## 2021-06-17 PROCEDURE — 73564 X-RAY EXAM KNEE 4 OR MORE: CPT

## 2021-06-17 RX ORDER — KETOROLAC TROMETHAMINE 30 MG/ML
15 INJECTION, SOLUTION INTRAMUSCULAR; INTRAVENOUS ONCE
Status: COMPLETED | OUTPATIENT
Start: 2021-06-17 | End: 2021-06-17

## 2021-06-17 RX ORDER — BACITRACIN, NEOMYCIN, POLYMYXIN B 400; 3.5; 5 [USP'U]/G; MG/G; [USP'U]/G
1 OINTMENT TOPICAL ONCE
Status: COMPLETED | OUTPATIENT
Start: 2021-06-17 | End: 2021-06-17

## 2021-06-17 RX ADMIN — TETANUS TOXOID, REDUCED DIPHTHERIA TOXOID AND ACELLULAR PERTUSSIS VACCINE, ADSORBED 0.5 ML: 5; 2.5; 8; 8; 2.5 SUSPENSION INTRAMUSCULAR at 09:58

## 2021-06-17 RX ADMIN — BACITRACIN, NEOMYCIN, POLYMYXIN B 1 SMALL APPLICATION: 400; 3.5; 5 OINTMENT TOPICAL at 09:58

## 2021-06-17 RX ADMIN — SODIUM CHLORIDE 1000 ML: 0.9 INJECTION, SOLUTION INTRAVENOUS at 10:51

## 2021-06-17 RX ADMIN — IOHEXOL 85 ML: 350 INJECTION, SOLUTION INTRAVENOUS at 10:53

## 2021-06-17 RX ADMIN — IOHEXOL 100 ML: 350 INJECTION, SOLUTION INTRAVENOUS at 09:37

## 2021-06-17 RX ADMIN — KETOROLAC TROMETHAMINE 15 MG: 30 INJECTION, SOLUTION INTRAMUSCULAR; INTRAVENOUS at 10:50
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
## 2021-06-18 LAB
ATRIAL RATE: 83 BPM
P AXIS: 67 DEGREES
PR INTERVAL: 142 MS
QRS AXIS: 80 DEGREES
QRSD INTERVAL: 96 MS
QT INTERVAL: 362 MS
QTC INTERVAL: 425 MS
T WAVE AXIS: 59 DEGREES
VENTRICULAR RATE: 83 BPM

## 2021-06-18 PROCEDURE — 93010 ELECTROCARDIOGRAM REPORT: CPT | Performed by: INTERNAL MEDICINE

## 2021-06-23 ENCOUNTER — OFFICE VISIT (OUTPATIENT)
Dept: URGENT CARE | Facility: MEDICAL CENTER | Age: 26
End: 2021-06-23
Payer: COMMERCIAL

## 2021-06-23 VITALS
SYSTOLIC BLOOD PRESSURE: 147 MMHG | RESPIRATION RATE: 18 BRPM | DIASTOLIC BLOOD PRESSURE: 80 MMHG | WEIGHT: 208.25 LBS | HEART RATE: 66 BPM | HEIGHT: 69 IN | OXYGEN SATURATION: 97 % | BODY MASS INDEX: 30.84 KG/M2

## 2021-06-23 DIAGNOSIS — S10.93XA HEMATOMA OF NECK, INITIAL ENCOUNTER: ICD-10-CM
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
## 2021-06-23 DIAGNOSIS — M54.2 NECK PAIN: ICD-10-CM

## 2021-06-23 PROCEDURE — 99213 OFFICE O/P EST LOW 20 MIN: CPT | Performed by: PHYSICIAN ASSISTANT

## 2021-06-23 PROCEDURE — G0382 LEV 3 HOSP TYPE B ED VISIT: HCPCS | Performed by: PHYSICIAN ASSISTANT

## 2021-06-23 RX ORDER — CYCLOBENZAPRINE HCL 10 MG
10 TABLET ORAL
Qty: 20 TABLET | Refills: 0 | Status: SHIPPED | OUTPATIENT
Start: 2021-06-23 | End: 2022-01-31 | Stop reason: ALTCHOICE

## 2021-06-23 NOTE — LETTER
June 23, 2021     Patient: Lilliana Woo   YOB: 1995   Date of Visit: 6/23/2021       To Whom it May Concern:    Lilliana Woo was seen in my clinic on 6/23/2021  Please excuse 6/23/2021 and 6/24/2021  If you have any questions or concerns, please don't hesitate to call           Sincerely,          Floresita Cisneros PA-C        CC: Lilliana Valladaresalmita
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
## 2021-08-16 ENCOUNTER — TELEMEDICINE (OUTPATIENT)
Dept: INTERNAL MEDICINE CLINIC | Age: 26
End: 2021-08-16
Payer: COMMERCIAL

## 2021-08-16 VITALS — WEIGHT: 208 LBS | HEIGHT: 68 IN | BODY MASS INDEX: 31.52 KG/M2

## 2021-08-16 DIAGNOSIS — Z20.822 EXPOSURE TO COVID-19 VIRUS: Primary | ICD-10-CM

## 2021-08-16 PROCEDURE — 87635 SARS-COV-2 COVID-19 AMP PRB: CPT | Performed by: STUDENT IN AN ORGANIZED HEALTH CARE EDUCATION/TRAINING PROGRAM

## 2021-08-16 PROCEDURE — 99213 OFFICE O/P EST LOW 20 MIN: CPT | Performed by: INTERNAL MEDICINE

## 2021-08-16 NOTE — PROGRESS NOTES
COVID-19 Outpatient Progress Note    Assessment/Plan:    Problem List Items Addressed This Visit        Other    Exposure to COVID-19 virus - Primary    Relevant Orders    Novel Coronavirus (COVID-19), PCR [facility] Collected at   [facility] or [facility]         Disposition:     I recommended COVID-19 PCR testing on or after day 5 since last exposure and if negative can end quarantine after 7 days  Patient was instructed to watch for symptoms until 14 days after exposure  If patient were to develop symptoms, they should immediately self isolate and call our office for further guidance  Patient has been fully vaccinated against COVID-19 and has come in close contact with a positive COVID-19 person  It was recommended that they get tested in 3-5 days after the date of last exposure and wear a mask in public indoor settings for 14 days after exposure or until a negative test result  I referred patient to one of our centralized sites for a COVID-19 swab  I referred patient to a [facility] for further evaluation  Supportive care with tylenol, fluids, rest    Or mask indoors and disinfect counter tops and surfaces frequently, be sure not to share any personal items   COVID test ordered   if any shortness of breath, fevers greater than 102 or worsening of symptoms acutely, advised patient to go to emergency department    I have spent 20 minutes directly with the patient  Greater than 50% of this time was spent in counseling/coordination of care regarding: diagnostic results, prognosis, patient and family education, risk factor reductions and impressions          Verification of patient location:    Patient is located in the following state in which I hold an active license [place]    Encounter provider Nirmal Hardy DO    Provider located at [place] [address] [place] [address]    Recent Visits  No visits were found meeting these conditions  Showing recent visits within past 7 days and meeting all other requirements  Today's Visits  Date Type Provider Dept   08/16/21 Telemedicine [facility]   Showing today's visits and meeting all other requirements  Future Appointments  No visits were found meeting these conditions  Showing future appointments within next 150 days and meeting all other requirements     This virtual check-in was done via [employer] and patient was informed that this is a secure, HIPAA-compliant platform  He agrees to proceed  Patient agrees to participate in a virtual check in via telephone or video visit instead of presenting to the office to address urgent/immediate medical needs  Patient is aware this is a billable service  After connecting through [facility], the patient was identified by name and date of birth  Jami Garzon was informed that this was a telemedicine visit and that the exam was being conducted confidentially over secure lines  My office door was closed  No one else was in the room  Jami Garzon acknowledged consent and understanding of privacy and security of the telemedicine visit  I informed the patient that I have reviewed his record in Epic and presented the opportunity for him to ask any questions regarding the visit today  The patient agreed to participate  Subjective:   Jami Garzon is a 23 y o  male who is concerned about COVID-19  Patient's symptoms include fever, fatigue, malaise, nasal congestion, rhinorrhea, sore throat, anosmia and myalgias  Patient denies loss of taste, cough, shortness of breath and diarrhea       Date of symptom onset: 8/13/2021  COVID-19 vaccination status: Fully vaccinated    Exposure:   Contact with a person who is under investigation (PUI) for or who is positive for COVID-19 within the last 14 days?: No    Hospitalized recently for fever and/or lower respiratory symptoms?: No      Currently a healthcare worker that is involved in direct patient care?: No      Works in a special setting where the risk of COVID-19 transmission may be high? (this may include long-term care, correctional and custodial facilities; homeless shelters; assisted-living facilities and group homes ): No      Resident in a special setting where the risk of COVID-19 transmission may be high? (this may include long-term care, correctional and custodial facilities; homeless shelters; assisted-living facilities and group homes ): No      Trying to take dayquil/nyquil, motrin, allegra but not helping  Live with wife at home and two roommates, which he can be separate from    Lab Results   Component Value Date    [address] [facility] 98 Not Detected 06/08/2020     Past Medical History:   Diagnosis Date    Irregular heartbeat     Last Assessed [id number]     History reviewed  No pertinent surgical history  Current Outpatient Medications   Medication Sig Dispense Refill    fexofenadine (ALLEGRA) 180 MG tablet Take 180 mg by mouth daily      albuterol (PROAIR HFA) 90 mcg/act inhaler prn (Patient not taking: Reported on 6/23/2021)      cyclobenzaprine (FLEXERIL) 10 mg tablet Take 1 tablet (10 mg total) by mouth daily at bedtime (Patient not taking: Reported on 8/16/2021) 20 tablet 0    levocetirizine (XYZAL) 5 MG tablet Take 1 tablet by mouth daily (Patient not taking: Reported on 8/16/2021)      ondansetron (ZOFRAN) 4 mg tablet Take 1 tablet (4 mg total) by mouth every 8 (eight) hours as needed for nausea or vomiting (Patient not taking: Reported on 6/23/2021) 20 tablet 0    sertraline (ZOLOFT) 25 mg tablet Take 1 tablet at bedtime for 1 week then increased to 2 tablets daily at bedtime (Patient not taking: Reported on 6/8/2020) 60 tablet 0    sertraline (ZOLOFT) 50 mg tablet Take 0 5 tablet for seven days  On day eight increase to a full tablet  (Patient not taking: Reported on 6/8/2020) 30 tablet 0     No current facility-administered medications for this visit  Allergies   Allergen Reactions    Other Itching     Seasonal allergies- eyes and nose itch, stuffy , wheezing       Review of Systems   Constitutional: Positive for fatigue and fever  HENT: Positive for congestion, rhinorrhea and sore throat  Respiratory: Negative for cough and shortness of breath  Gastrointestinal: Negative for diarrhea  Musculoskeletal: Positive for myalgias  Objective:    Vitals:    08/16/21 1322   Weight: 94 3 kg (208 lb)   Height: 5' 8" (1 727 m)       Physical Exam  Constitutional:       Appearance: He is normal weight  He is not ill-appearing  HENT:      Nose: Congestion present  No rhinorrhea  Mouth/Throat:      Mouth: Mucous membranes are moist    Pulmonary:      Effort: Pulmonary effort is normal    Neurological:      Mental Status: He is alert  Psychiatric:         Mood and Affect: Mood normal          Behavior: Behavior normal          VIRTUAL VISIT [address] verbally agrees to participate in [employer]  Pt is aware that [employer] could be limited without vital signs or the ability to perform a full hands-on physical exam  Delmar Maxwell understands he or the provider may request at any time to terminate the video visit and request the patient to seek care or treatment in person

## 2021-08-23 ENCOUNTER — TELEMEDICINE (OUTPATIENT)
Dept: INTERNAL MEDICINE CLINIC | Facility: CLINIC | Age: 26
End: 2021-08-23
Payer: COMMERCIAL

## 2021-08-23 VITALS — WEIGHT: 208 LBS | HEIGHT: 68 IN | BODY MASS INDEX: 31.52 KG/M2 | TEMPERATURE: 99 F

## 2021-08-23 DIAGNOSIS — U07.1 COVID-19: Primary | ICD-10-CM

## 2021-08-23 PROCEDURE — 99213 OFFICE O/P EST LOW 20 MIN: CPT | Performed by: NURSE PRACTITIONER

## 2021-08-23 PROCEDURE — 3725F SCREEN DEPRESSION PERFORMED: CPT | Performed by: NURSE PRACTITIONER

## 2021-08-23 PROCEDURE — 1036F TOBACCO NON-USER: CPT | Performed by: NURSE PRACTITIONER

## 2021-08-23 PROCEDURE — 3008F BODY MASS INDEX DOCD: CPT | Performed by: NURSE PRACTITIONER

## 2021-08-23 RX ORDER — AZITHROMYCIN 250 MG/1
TABLET, FILM COATED ORAL
Qty: 6 TABLET | Refills: 0 | Status: SHIPPED | OUTPATIENT
Start: 2021-08-23 | End: 2021-08-28

## 2021-08-23 NOTE — LETTER
August 23, 2021     Patient: Yani Maciel   YOB: 1995   Date of Visit: 8/23/2021       To Whom it May Concern:    Yani Maciel is under my professional care  He was seen in my office on 8/23/2021  He may return to work on 8/30/2021  If you have any questions or concerns, please don't hesitate to call           Sincerely,          HEMANTH Poole        CC: No Recipients

## 2021-08-23 NOTE — PATIENT INSTRUCTIONS

## 2021-08-23 NOTE — PROGRESS NOTES
COVID-19 Outpatient Progress Note    Assessment/Plan:    Problem List Items Addressed This Visit     None      Visit Diagnoses     COVID-19    -  Primary    Relevant Medications    azithromycin (Zithromax) 250 mg tablet         Disposition:     I recommended continued isolation until at least 24 hours have passed since recovery defined as resolution of fever without the use of fever-reducing medications AND improvement in COVID symptoms AND 10 days have passed since onset of symptoms (or 10 days have passed since date of first positive viral diagnostic test for asymptomatic patients)  Will start zpack d/t continued fevers and resp symptoms  Continue symptomatic care  Reviewed red flag signs to call the office with or go to the Emergency Department with  Patient verbalizes understanding  I have spent 12 minutes directly with the patient  Greater than 50% of this time was spent in counseling/coordination of care regarding: diagnostic results, prognosis, risks and benefits of treatment options, instructions for management, patient and family education, importance of treatment compliance, risk factor reductions and impressions  Verification of patient location:    Patient is located in the following state in which I hold an active license [place]    Encounter provider HEMANTH Daily    Provider located at [address] [place] [id number]    Recent Visits  No visits were found meeting these conditions  Showing recent visits within past 7 days and meeting all other requirements  Today's Visits  Date Type Provider Dept   08/23/21 Telemedicine [facility]   Showing today's visits and meeting all other requirements  Future Appointments  No visits were found meeting these conditions    Showing future appointments within next 150 days and meeting all other requirements     This virtual check-in was done via [email or web address] and patient was informed that this is a secure, HIPAA-compliant platform  He agrees to proceed  Patient agrees to participate in a virtual check in via telephone or video visit instead of presenting to the office to address urgent/immediate medical needs  Patient is aware this is a billable service  After connecting through [facility], the patient was identified by name and date of birth  Abilio Contreras was informed that this was a telemedicine visit and that the exam was being conducted confidentially over secure lines  My office door was closed  No one else was in the room  Abilio Contreras acknowledged consent and understanding of privacy and security of the telemedicine visit  I informed the patient that I have reviewed his record in Epic and presented the opportunity for him to ask any questions regarding the visit today  The patient agreed to participate  Subjective:   Abilio Contreras is a 23 y o  male who has been screened for COVID-19  Symptom change since last report: improving  Patient's symptoms include fever (101 yesterday, today 99), fatigue, nasal congestion, sore throat, anosmia (improving slightly), loss of taste (improving slightly), chest tightness (" burning on deep inspiration"), abdominal pain, myalgias and headache (mild, intermittent)  Patient denies chills, rhinorrhea, cough, shortness of breath, nausea, vomiting and diarrhea  Date of symptom onset: 8/13/2021  Date of positive COVID-19 PCR: 8/16/2021  COVID-19 vaccination status: Fully vaccinated    Cindy Avalos has been staying home and has isolated themselves in his home  He is taking care to not share personal items and is cleaning all surfaces that are touched often, like counters, tabletops, and doorknobs using household cleaning sprays or wipes  He is wearing a mask when he leaves his room       He has been controlling his symptoms with tylenol, motrin, Dayquil    Lab Results   Component Value Date    SARSCOV2 Positive (A) 08/16/2021    SARSCOV2 Not Detected 06/08/2020     Past Medical History:   Diagnosis Date    Irregular heartbeat     Last Assessed [id number]     History reviewed  No pertinent surgical history  Current Outpatient Medications   Medication Sig Dispense Refill    albuterol (PROAIR HFA) 90 mcg/act inhaler prn      cyclobenzaprine (FLEXERIL) 10 mg tablet Take 1 tablet (10 mg total) by mouth daily at bedtime 20 tablet 0    fexofenadine (ALLEGRA) 180 MG tablet Take 180 mg by mouth daily      levocetirizine (XYZAL) 5 MG tablet Take 1 tablet by mouth daily       ondansetron (ZOFRAN) 4 mg tablet Take 1 tablet (4 mg total) by mouth every 8 (eight) hours as needed for nausea or vomiting 20 tablet 0    azithromycin (Zithromax) 250 mg tablet Take 2 tablets (500 mg total) by mouth daily for 1 day, THEN 1 tablet (250 mg total) daily for 4 days  6 tablet 0    sertraline (ZOLOFT) 25 mg tablet Take 1 tablet at bedtime for 1 week then increased to 2 tablets daily at bedtime (Patient not taking: Reported on 8/23/2021) 60 tablet 0    sertraline (ZOLOFT) 50 mg tablet Take 0 5 tablet for seven days  On day eight increase to a full tablet  (Patient not taking: Reported on 8/23/2021) 30 tablet 0     No current facility-administered medications for this visit  Allergies   Allergen Reactions    Other Itching     Seasonal allergies- eyes and nose itch, stuffy , wheezing       Review of Systems   Constitutional: Positive for fatigue and fever (101 yesterday, today 99)  Negative for chills  HENT: Positive for congestion and sore throat  Negative for rhinorrhea  Respiratory: Positive for chest tightness (" burning on deep inspiration")  Negative for cough and shortness of breath  Cardiovascular: Negative for chest pain  Gastrointestinal: Positive for abdominal pain and constipation  Negative for diarrhea, nausea and vomiting     Musculoskeletal: Positive for myalgias  Skin: Negative for rash  Neurological: Positive for dizziness (when standing up quickly) and headaches (mild, intermittent)  Negative for light-headedness  Objective:    Vitals:    08/23/21 1107   Temp: 99 °F (37 2 °C)   TempSrc: Temporal   Weight: 94 3 kg (208 lb)   Height: 5' 8" (1 727 m)       Physical Exam  Constitutional:       General: He is not in acute distress  Appearance: Normal appearance  He is not ill-appearing  HENT:      Head: Normocephalic and atraumatic  Eyes:      General: No scleral icterus  Pulmonary:      Effort: Pulmonary effort is normal  No respiratory distress  Comments: No visible signs of distress  Abdominal:      Tenderness: There is no abdominal tenderness  Musculoskeletal:      Cervical back: Normal range of motion  Skin:     Findings: No erythema  Neurological:      Mental Status: He is alert and oriented to person, place, and time  Psychiatric:         Mood and Affect: Mood normal          Behavior: Behavior normal          VIRTUAL VISIT [address] verbally agrees to participate in [facility]  Pt is aware that [facility] could be limited without vital signs or the ability to perform a full hands-on physical exam  Delmar Maxwell understands he or the provider may request at any time to terminate the video visit and request the patient to seek care or treatment in person

## 2022-01-31 ENCOUNTER — TELEMEDICINE (OUTPATIENT)
Dept: INTERNAL MEDICINE CLINIC | Age: 27
End: 2022-01-31
Payer: COMMERCIAL

## 2022-01-31 VITALS — WEIGHT: 208 LBS | BODY MASS INDEX: 31.52 KG/M2 | HEIGHT: 68 IN

## 2022-01-31 DIAGNOSIS — Z20.822 EXPOSURE TO COVID-19 VIRUS: Primary | ICD-10-CM

## 2022-01-31 PROCEDURE — 87636 SARSCOV2 & INF A&B AMP PRB: CPT | Performed by: STUDENT IN AN ORGANIZED HEALTH CARE EDUCATION/TRAINING PROGRAM

## 2022-01-31 PROCEDURE — 99213 OFFICE O/P EST LOW 20 MIN: CPT | Performed by: STUDENT IN AN ORGANIZED HEALTH CARE EDUCATION/TRAINING PROGRAM

## 2022-01-31 NOTE — PROGRESS NOTES
COVID-19 Outpatient Progress Note    Assessment/Plan:    Problem List Items Addressed This Visit        Other    Exposure to COVID-19 virus - Primary         Disposition:     Referred patient to centralized site to test for COVID-19/Influenza/RSV  Patient is fully vaccinated and I recommended self quarantine for 5 days followed by strict mask use for an additional 5 days  If patient were to develop symptoms, they should immediately self isolate and call our office for further guidance  I referred patient to a [facility] for further evaluation  Discussed symptom directed medication options with patient  Symptomatic treatment Tylenol, plenty of fluids, Mucinex and Chloraseptic spray  Mask around others and self isolate as much as possible   do not share personal products  The to the emergency room if fever above 101, shortness of breath to the extent or cannot catch breath overall worsening of symptoms    I have spent 30 minutes directly with the patient  Greater than 50% of this time was spent in counseling/coordination of care regarding: instructions for management, patient and family education, risk factor reductions and impressions  Encounter provider Liz Hwang DO    Provider located at [place] [address] [place] [phone]    Recent Visits  No visits were found meeting these conditions  Showing recent visits within past 7 days and meeting all other requirements  Today's Visits  Date Type Provider Dept   01/31/22 Telemedicine Liz Hwang DO [facility]   Showing today's visits and meeting all other requirements  Future Appointments  No visits were found meeting these conditions  Showing future appointments within next 150 days and meeting all other requirements     This virtual check-in was done via [facility]on and patient was informed that this is a secure, HIPAA-compliant platform  He agrees to proceed  Patient agrees to participate in a virtual check in via telephone or video visit instead of presenting to the office to address urgent/immediate medical needs  Patient is aware this is a billable service  After connecting through [facility], the patient was identified by name and date of birth  Margi Lord was informed that this was a telemedicine visit and that the exam was being conducted confidentially over secure lines  My office door was closed  No one else was in the room  Margi Lord acknowledged consent and understanding of privacy and security of the telemedicine visit  I informed the patient that I have reviewed his record in Epic and presented the opportunity for him to ask any questions regarding the visit today  The patient agreed to participate  Verification of patient location:  Patient is located in the following state in which I hold an active license: [place]    Subjective:   Margi Lord is a 23 y o  male who is concerned about COVID-19  Patient's symptoms include malaise, sore throat, cough, shortness of breath and nausea  Patient denies fever and chills       - Date of symptom onset: 1/26/2022  - Date of exposure: 1/23/2022      COVID-19 vaccination status: Fully vaccinated (primary series)    Exposure:   Contact with a person who is under investigation (PUI) for or who is positive for COVID-19 within the last 14 days?: Yes    Hospitalized recently for fever and/or lower respiratory symptoms?: No      Currently a healthcare worker that is involved in direct patient care?: No      Works in a special setting where the risk of COVID-19 transmission may be high? (this may include long-term care, correctional and [facility] facilities; homeless shelters; assisted-living facilities and group homes ): No      Resident in a special setting where the risk of COVID-19 transmission may be high? (this may include long-term care, correctional and [facility] facilities; homeless shelters; assisted-living facilities and group homes ): No      Roommate tested positive    Home tests one positive one negative  Wife also not feeling well, missing work since 1/28  No fevers or chills      Lab Results   Component Value Date    SARSCOV2 Positive (A) 08/16/2021    [address] [facility] 98 Not Detected 06/08/2020     Past Medical History:   Diagnosis Date    Irregular heartbeat     Last Assessed [id number]     History reviewed  No pertinent surgical history  Current Outpatient Medications   Medication Sig Dispense Refill    fexofenadine (ALLEGRA) 180 MG tablet Take 180 mg by mouth daily       No current facility-administered medications for this visit  Allergies   Allergen Reactions    Other Itching     Seasonal allergies- eyes and nose itch, stuffy , wheezing       Review of Systems   Constitutional: Negative for chills and fever  HENT: Positive for sore throat  Respiratory: Positive for cough and shortness of breath  Gastrointestinal: Positive for nausea  Objective:    Vitals:    01/31/22 0921   Weight: 94 3 kg (208 lb)   Height: 5' 8" (1 727 m)       Physical Exam  Constitutional:       Appearance: Normal appearance  He is not ill-appearing  HENT:      Mouth/Throat:      Mouth: Mucous membranes are dry  Pulmonary:      Effort: Pulmonary effort is normal       Comments: Intermittent cough  Neurological:      Mental Status: He is alert  Psychiatric:         Mood and Affect: Mood normal          Behavior: Behavior normal          VIRTUAL VISIT [address] [place] verbally agrees to participate in [employer]  Pt is aware that [employer] could be limited without vital signs or the ability to perform a full hands-on physical exam  Delmar Maxwell understands he or the provider may request at any time to terminate the video visit and request the patient to seek care or treatment in person

## 2022-02-01 LAB
FLUAV RNA RESP QL NAA+PROBE: NEGATIVE
FLUBV RNA RESP QL NAA+PROBE: NEGATIVE
SARS-COV-2 RNA RESP QL NAA+PROBE: POSITIVE

## 2022-02-02 ENCOUNTER — TELEMEDICINE (OUTPATIENT)
Dept: INTERNAL MEDICINE CLINIC | Age: 27
End: 2022-02-02
Payer: COMMERCIAL

## 2022-02-02 VITALS — WEIGHT: 190 LBS | BODY MASS INDEX: 28.79 KG/M2 | HEIGHT: 68 IN

## 2022-02-02 DIAGNOSIS — U07.1 COVID-19: Primary | ICD-10-CM

## 2022-02-02 PROCEDURE — 3008F BODY MASS INDEX DOCD: CPT | Performed by: PHYSICIAN ASSISTANT

## 2022-02-02 PROCEDURE — 99213 OFFICE O/P EST LOW 20 MIN: CPT | Performed by: PHYSICIAN ASSISTANT

## 2022-02-02 PROCEDURE — 1036F TOBACCO NON-USER: CPT | Performed by: PHYSICIAN ASSISTANT

## 2022-02-02 NOTE — PROGRESS NOTES
COVID-19 Outpatient Progress Note    Assessment/Plan:    Problem List Items Addressed This Visit     None      Visit Diagnoses     COVID-19    -  Primary    pt to quarantine for 10 days as sx not resolved         pt states work asking for negative test, instructed to do home rapid test if they will accept this  He will let us know if any further assistance with this needed  Disposition:     Patient has COVID-19 infection  Based off CDC guidelines, they were recommended to isolate for 5 days from the date of the positive test  If they remain asymptomatic, isolation may be ended followed by 5 days of wearing a mask when around othes to minimize risk of infecting others  If they have a fever, continue to stay home until fever resolves for at least 24 hours  I recommended continued isolation until at least 24 hours have passed since recovery defined as resolution of fever without the use of fever-reducing medications AND improvement in COVID symptoms AND 10 days have passed since onset of symptoms (or 10 days have passed since date of first positive viral diagnostic test for asymptomatic patients)  I have spent 15 minutes directly with the patient  Greater than 50% of this time was spent in counseling/coordination of care regarding: risks and benefits of treatment options and instructions for management        Encounter provider Anna Goodwin PA-C    Provider located at [address] [address]    Recent Visits  Date Type Provider Dept   01/31/22 Telemedicine Rigo Mc DO [facility]   Showing recent visits within past 7 days and meeting all other requirements  Today's Visits  Date Type Provider Dept   02/02/22 Telemedicine Anna Goodwin PA-C [facility]   Showing today's visits and meeting all other requirements  Future Appointments  No visits were found meeting these conditions  Showing future appointments within next 150 days and meeting all other requirements     This virtual check-in was done via [address] and patient was informed that this is a secure, HIPAA-compliant platform  He agrees to proceed  Patient agrees to participate in a virtual check in via telephone or video visit instead of presenting to the office to address urgent/immediate medical needs  Patient is aware this is a billable service  After connecting through [facility], the patient was identified by name and date of birth  Nati Rodriguez was informed that this was a telemedicine visit and that the exam was being conducted confidentially over secure lines  My office door was closed  No one else was in the room  Nati Rodriguez acknowledged consent and understanding of privacy and security of the telemedicine visit  I informed the patient that I have reviewed his record in Epic and presented the opportunity for him to ask any questions regarding the visit today  The patient agreed to participate  Verification of patient location:  Patient is located in the following state in which I hold an active license: [place]    Subjective:   Nati Rodriguez is a 23 y o  male who has been screened for COVID-19  Symptom change since last report: improving  Patient's symptoms include fatigue, malaise, nasal congestion, sore throat, cough and myalgias  Patient denies fever, chills, rhinorrhea, anosmia, loss of taste, shortness of breath, chest tightness, abdominal pain, nausea, vomiting and diarrhea  - Date of symptom onset: 1/26/2022  - Date of exposure: 1/23/2022  - Date of positive COVID-19 test: 1/31/2022  Type of test: PCR  COVID-19 vaccination status: Fully vaccinated (primary series)    Fabiana Nunn has been staying home and has isolated themselves in his home   He is taking care to not share personal items and is cleaning all surfaces that are touched often, like counters, tabletops, and doorknobs using household cleaning sprays or wipes  He is wearing a mask when he leaves his room  Lab Results   Component Value Date    SARSCOV2 Positive (A) 01/31/2022    SARSCOV2 Not Detected 06/08/2020     Past Medical History:   Diagnosis Date    Irregular heartbeat     Last Assessed [id number]     History reviewed  No pertinent surgical history  Current Outpatient Medications   Medication Sig Dispense Refill    fexofenadine (ALLEGRA) 180 MG tablet Take 180 mg by mouth daily       No current facility-administered medications for this visit  Allergies   Allergen Reactions    Other Itching     Seasonal allergies- eyes and nose itch, stuffy , wheezing       Review of Systems   Constitutional: Positive for fatigue  Negative for chills and fever  HENT: Positive for congestion and sore throat  Negative for rhinorrhea  Respiratory: Positive for cough  Negative for chest tightness and shortness of breath  Gastrointestinal: Negative for abdominal pain, diarrhea, nausea and vomiting  Musculoskeletal: Positive for myalgias  Objective:    Vitals:    02/02/22 0916   Weight: 86 2 kg (190 lb)   Height: 5' 8" (1 727 m)       Physical Exam    VIRTUAL VISIT [address] verbally agrees to participate in [facility]  Pt is aware that [facility] could be limited without vital signs or the ability to perform a full hands-on physical exam  Delmar Maxwell understands he or the provider may request at any time to terminate the video visit and request the patient to seek care or treatment in person  COVID-19 Outpatient Progress Note    Assessment/Plan:    Problem List Items Addressed This Visit     None      Visit Diagnoses     COVID-19    -  Primary    pt to quarantine for 10 days as sx not resolved           Disposition:     Patient has COVID-19 infection   Based off CDC guidelines, they were recommended to isolate for 5 days from the date of the positive test  If they remain asymptomatic, isolation may be ended followed by 5 days of wearing a mask when around othes to minimize risk of infecting others  If they have a fever, continue to stay home until fever resolves for at least 24 hours  I recommended continued isolation until at least 24 hours have passed since recovery defined as resolution of fever without the use of fever-reducing medications AND improvement in COVID symptoms AND 10 days have passed since onset of symptoms (or 10 days have passed since date of first positive viral diagnostic test for asymptomatic patients)  Pt to continue supportive care, fluids  Return to work ok for 2/7/22    I have spent 15 minutes directly with the patient  Greater than 50% of this time was spent in counseling/coordination of care regarding: instructions for management and patient and family education  Encounter provider Thalia Buckley PA-C    Provider located at [address] [address]    Recent Visits  Date Type Provider Dept   01/31/22 Telemedicine Zain Andersen DO [facility]   Showing recent visits within past 7 days and meeting all other requirements  Today's Visits  Date Type Provider Dept   02/02/22 Telemedicine Thalia Buckley PA-C [facility]   Showing today's visits and meeting all other requirements  Future Appointments  No visits were found meeting these conditions  Showing future appointments within next 150 days and meeting all other requirements     This virtual check-in was done via [address] and patient was informed that this is a secure, HIPAA-compliant platform  He agrees to proceed  Patient agrees to participate in a virtual check in via telephone or video visit instead of presenting to the office to address urgent/immediate medical needs  Patient is aware this is a billable service      After connecting through [employer], the patient was identified by name and date of birth  Chantel Maharaj was informed that this was a telemedicine visit and that the exam was being conducted confidentially over secure lines  My office door was closed  No one else was in the room  Chantel Maharaj acknowledged consent and understanding of privacy and security of the telemedicine visit  I informed the patient that I have reviewed his record in Epic and presented the opportunity for him to ask any questions regarding the visit today  The patient agreed to participate  Verification of patient location:  Patient is located in the following state in which I hold an active license: [place]    Subjective:   Chantel Maharaj is a 23 y o  male who has been screened for COVID-19  Symptom change since last report: improving  Patient's symptoms include fatigue, malaise, sore throat, cough and myalgias  Patient denies fever, chills, congestion, rhinorrhea, anosmia, loss of taste, shortness of breath, chest tightness, abdominal pain, nausea, vomiting, diarrhea and headaches  - Date of symptom onset: 1/26/2022  - Date of exposure: 1/23/2022  - Date of positive COVID-19 test: 1/31/2022  Type of test: PCR  COVID-19 vaccination status: Fully vaccinated (primary series)    Lucio Patricio has been staying home and has isolated themselves in his home  He is taking care to not share personal items and is cleaning all surfaces that are touched often, like counters, tabletops, and doorknobs using household cleaning sprays or wipes  He is wearing a mask when he leaves his room  Lab Results   Component Value Date    SARSCOV2 Positive (A) 01/31/2022    SARSCOV2 Not Detected 06/08/2020     Past Medical History:   Diagnosis Date    Irregular heartbeat     Last Assessed [id number]     History reviewed  No pertinent surgical history    Current Outpatient Medications   Medication Sig Dispense Refill    fexofenadine (ALLEGRA) 180 MG tablet Take 180 mg by mouth daily       No current facility-administered medications for this visit  Allergies   Allergen Reactions    Other Itching     Seasonal allergies- eyes and nose itch, stuffy , wheezing       Review of Systems   Constitutional: Positive for fatigue  Negative for chills and fever  HENT: Positive for sore throat  Negative for congestion and rhinorrhea  Respiratory: Positive for cough  Negative for chest tightness and shortness of breath  Gastrointestinal: Negative for abdominal pain, diarrhea, nausea and vomiting  Musculoskeletal: Positive for myalgias  Neurological: Negative for headaches  Objective:    Vitals:    02/02/22 0916   Weight: 86 2 kg (190 lb)   Height: 5' 8" (1 727 m)       Physical Exam  Vitals reviewed  Pulmonary:      Effort: Pulmonary effort is normal    Neurological:      General: No focal deficit present  Mental Status: He is alert  VIRTUAL VISIT [address] verbally agrees to participate in [employer]  Pt is aware that [employer] could be limited without vital signs or the ability to perform a full hands-on physical exam  Delmar Maxwell understands he or the provider may request at any time to terminate the video visit and request the patient to seek care or treatment in person

## 2022-02-02 NOTE — PROGRESS NOTES
COVID-19 Outpatient Progress Note    Assessment/Plan:    Problem List Items Addressed This Visit     None         COVID-19 Plan   Encounter provider Shravan Kirkpatrick PA-C    Provider located at [address] [address]    Recent Visits  Date Type Provider Dept   01/31/22 Telemedicine Liz Hwang DO [facility]   Showing recent visits within past 7 days and meeting all other requirements  Today's Visits  Date Type Provider Dept   02/02/22 Telemedicine Shravan Kirkpatrick PA-C [facility]   Showing today's visits and meeting all other requirements  Future Appointments  No visits were found meeting these conditions  Showing future appointments within next 150 days and meeting all other requirements     COVID-19 HPI  Lab Results   Component Value Date    SARSCOV2 Positive (A) 01/31/2022    SARSCOV2 Not Detected 06/08/2020     Past Medical History:   Diagnosis Date    Irregular heartbeat     Last Assessed [id number]     History reviewed  No pertinent surgical history  Current Outpatient Medications   Medication Sig Dispense Refill    fexofenadine (ALLEGRA) 180 MG tablet Take 180 mg by mouth daily       No current facility-administered medications for this visit  Allergies   Allergen Reactions    Other Itching     Seasonal allergies- eyes and nose itch, stuffy , wheezing       Review of Systems  Objective:    Vitals:    02/02/22 0916   Weight: 86 2 kg (190 lb)   Height: 5' 8" (1 727 m)       Physical Exam    VIRTUAL VISIT [address] verbally agrees to participate in [employer]   Pt is aware that [employer] could be limited without vital signs or the ability to perform a full hands-on physical exam  Delmar Maxwell understands he or the provider may request at any time to terminate the video visit and request the patient to seek care or treatment in person

## 2022-02-02 NOTE — LETTER
February 2, 2022     Patient: Ludwig Stroud   YOB: 1995   Date of Visit: 2/2/2022       To Whom It May Concern:    Patient was seen on 1/31 and 2/2/22 and It is my medical opinion that Ludwig Stroud may return to work on 2/7/22  If you have any questions or concerns, please don't hesitate to call           Sincerely,        Bowen Timmons PA-C    CC: No Recipients

## 2022-06-14 ENCOUNTER — TELEMEDICINE (OUTPATIENT)
Dept: INTERNAL MEDICINE CLINIC | Age: 27
End: 2022-06-14
Payer: COMMERCIAL

## 2022-06-14 DIAGNOSIS — J30.2 SEASONAL ALLERGIC RHINITIS, UNSPECIFIED TRIGGER: ICD-10-CM

## 2022-06-14 DIAGNOSIS — Z11.59 ENCOUNTER FOR HEPATITIS C SCREENING TEST FOR LOW RISK PATIENT: ICD-10-CM

## 2022-06-14 DIAGNOSIS — G43.009 MIGRAINE WITHOUT AURA AND WITHOUT STATUS MIGRAINOSUS, NOT INTRACTABLE: Primary | ICD-10-CM

## 2022-06-14 PROBLEM — B34.9 ACUTE VIRAL SYNDROME: Status: RESOLVED | Noted: 2020-06-08 | Resolved: 2022-06-14

## 2022-06-14 PROCEDURE — 3725F SCREEN DEPRESSION PERFORMED: CPT | Performed by: FAMILY MEDICINE

## 2022-06-14 PROCEDURE — 99213 OFFICE O/P EST LOW 20 MIN: CPT | Performed by: FAMILY MEDICINE

## 2022-06-14 RX ORDER — MONTELUKAST SODIUM 10 MG/1
10 TABLET ORAL
Qty: 30 TABLET | Refills: 5 | Status: SHIPPED | OUTPATIENT
Start: 2022-06-14

## 2022-06-14 RX ORDER — RIZATRIPTAN BENZOATE 10 MG/1
10 TABLET ORAL AS NEEDED
Qty: 18 TABLET | Refills: 0 | Status: SHIPPED | OUTPATIENT
Start: 2022-06-14 | End: 2022-07-07 | Stop reason: ALTCHOICE

## 2022-06-14 NOTE — PROGRESS NOTES
Virtual Regular Visit    Verification of patient location:    Patient is located in the following state in which I hold an active license [place]      Assessment/Plan:    Problem List Items Addressed This Visit        Respiratory    Seasonal allergic rhinitis    Relevant Medications    montelukast (SINGULAIR) 10 mg tablet    Other Relevant Orders    [facility]ergy Panel, Adult    Food Allergy Profile       Cardiovascular and Mediastinum    Migraine without status migrainosus, not intractable - Primary    Relevant Medications    rizatriptan (Maxalt) 10 mg tablet    Other Relevant Orders    [facility] Allergy Panel, Adult    Food Allergy Profile      Other Visit Diagnoses     Encounter for hepatitis C screening test for low risk patient        Relevant Orders    Hepatitis C antibody        Increase water intake, advise checking labs after migraine has resolved  Start Singulair 10 Maxalt p r n  Recommend eye exam if not done so in the past year  BMI Counseling: There is no height or weight on file to calculate BMI  The BMI is above normal  Nutrition recommendations include moderation in carbohydrate intake  Exercise recommendations include exercising 3-5 times per week  No pharmacotherapy was ordered  Rationale for BMI follow-up plan is due to patient being overweight or obese  Depression Screening and Follow-up Plan: Patient was screened for depression during today's encounter  They screened negative with a PHQ-2 score of 0  Reason for visit is   Chief Complaint   Patient presents with    Virtual Brief Visit     [phone] virtual - severe migraines         Encounter provider Glo Richardson DO    Provider located at [place] [address] [place] [address]      Recent Visits  No visits were found meeting these conditions    Showing recent visits within past 7 days and meeting all other requirements  Today's Visits  Date Type Provider Dept   06/14/22 [address], DO [facility]   Showing today's visits and meeting all other requirements  Future Appointments  No visits were found meeting these conditions  Showing future appointments within next 150 days and meeting all other requirements       The patient was identified by name and date of birth  Caity Bowie was informed that this is a telemedicine visit and that the visit is being conducted through [address] [place] [address] [facility] [employer] and patient was informed that this is a secure, HIPAA-compliant platform  He agrees to proceed     My office door was closed  No one else was in the room  He acknowledged consent and understanding of privacy and security of the video platform  The patient has agreed to participate and understands they can discontinue the visit at any time  Patient is aware this is a billable service  Subjective  Caity Bowie is a 33 y o  male    HPI       Has migraines once a year around this time due to seasonal allergies  Takes over-the-counter Allegra  but still feels congested  Has never been allergy tested or taken prescription allergy medications  Feels that his allergies calls his symptoms  Start sometimes on a daily basis with headaches that can last 30 minutes to all day  Located on the frontal area  Sensitivity to light and sound associated with blurry vision  Has had Imitrex in the past but made him very drowsy and tired  Has been using over-the-counter Excedrin migraine and ibuprofen without any relief  Has not had any recent eye exams  When he gets these headaches he sits in a dark room put school compresses on his head and takes a nap which usually alleviate the issue  He has missed work today and yesterday due to his symptoms  Past Medical History:   Diagnosis Date    Irregular heartbeat     Last Assessed [id number]       History reviewed  No pertinent surgical history      Current Outpatient Medications   Medication Sig Dispense Refill    montelukast (SINGULAIR) 10 mg tablet Take 1 tablet (10 mg total) by mouth daily at bedtime 30 tablet 5    rizatriptan (Maxalt) 10 mg tablet Take 1 tablet (10 mg total) by mouth as needed for migraine Take at the onset of migraine; if symptoms continue or return, may take another dose at least 2 hours after first dose  Take no more than 2 doses in a day  18 tablet 0    fexofenadine (ALLEGRA) 180 MG tablet Take 180 mg by mouth daily       No current facility-administered medications for this visit  Allergies   Allergen Reactions    Other Itching     Seasonal allergies- eyes and nose itch, stuffy , wheezing       Review of Systems     Constitutional:  Denies fever or chills   Eyes:  Has blurry vision with migraines  HENT:  Denies nasal congestion or sore throat   Respiratory:  Denies cough or shortness of breath or wheezing  Cardiovascular:  Denies palpitations or chest pain  GI:  Denies abdominal pain, nausea, or vomiting, no loose stools, no reflux  Integument:  Denies rash , no open areas  Neurologic:  Denies headache or focal weakness, no dizziness  : no dysuria, or hematuria      Video Exam    There were no vitals filed for this visit  Physical Exam     Constitutional:  Well developed, well nourished, no acute distress, non-toxic appearance   Eyes:  Is conjunctiva normal , non icteric sclera  HENT:  Atraumatic, non congested  Respiratory:  Nonlabored, appears comfortable, no conversational dyspnea  Cardiovascular:   no LE edema b/l  Neurologic:  no focal deficits noted   Psychiatric:  Speech and behavior appropriate , AAO x 3          I spent  4 min 37 seconds minutes directly with the patient during this visit    [facility] [address] verbally agrees to participate in [employer]   Pt is aware that [employer] could be limited without vital signs or the ability to perform a full hands-on physical exam  Delmar Maxwell understands he or the provider may request at any time to terminate the video visit and request the patient to seek care or treatment in person

## 2022-06-15 ENCOUNTER — TELEPHONE (OUTPATIENT)
Dept: INTERNAL MEDICINE CLINIC | Age: 27
End: 2022-06-15

## 2022-06-15 NOTE — LETTER
June 14, 2022      Patient:            Abraham Fernández  YOB: 1995  Date of Visit:    6/14/2022        To Whom it May Concern:     Abraham Fernández is under my professional care  Falconjennifer Herring was seen in my office on 6/14/2022  Please excuse Lambert Herring from 6/13-6/15   Lambert Herring may return to work on Thursday June 16, 2022      If you have any questions or concerns, please don't hesitate to call            Sincerely,            Eva Rob DO           CC: No Recipients

## 2022-06-16 ENCOUNTER — TELEPHONE (OUTPATIENT)
Dept: INTERNAL MEDICINE CLINIC | Age: 27
End: 2022-06-16

## 2022-06-16 NOTE — TELEPHONE ENCOUNTER
I called the patient with the response  He verbalized understanding  Appt scheduled on  7/18/22 to address the CT results

## 2022-06-16 NOTE — TELEPHONE ENCOUNTER
Female called identifying herself as Larissa Ace wife  She was calling and stating that Maday Sutherland is "lethargic" and continues with the migraines  She was reviewing his My Chart results and noticed abnormal finding on the CT of the abd and pelvis done on 6/17/21 ordered by his previous PCP  She sates that the findings were never reviewed with them  She would like to know if the findings could be contributing to hs present condition, what if anything should be done to follow up and if a repeat C T scan now would be in order  I called the patient and he gave verbal permission to speak with Anthony Askew  He will complete a communication form next office visit

## 2022-06-16 NOTE — TELEPHONE ENCOUNTER
CT scan from 2021 reviewed  Incidental findings not contributing to current status of migraines  If there is no improvement and he is lethargic he should seek medical care in the emergency room ASAP  He was not evaluated in the office at last appointment

## 2022-06-22 ENCOUNTER — APPOINTMENT (OUTPATIENT)
Dept: LAB | Age: 27
End: 2022-06-22
Payer: COMMERCIAL

## 2022-06-22 ENCOUNTER — OFFICE VISIT (OUTPATIENT)
Dept: INTERNAL MEDICINE CLINIC | Age: 27
End: 2022-06-22
Payer: COMMERCIAL

## 2022-06-22 ENCOUNTER — TELEPHONE (OUTPATIENT)
Dept: INTERNAL MEDICINE CLINIC | Age: 27
End: 2022-06-22

## 2022-06-22 VITALS
HEART RATE: 61 BPM | WEIGHT: 207 LBS | DIASTOLIC BLOOD PRESSURE: 76 MMHG | OXYGEN SATURATION: 98 % | HEIGHT: 69 IN | BODY MASS INDEX: 30.66 KG/M2 | TEMPERATURE: 98.3 F | SYSTOLIC BLOOD PRESSURE: 132 MMHG

## 2022-06-22 DIAGNOSIS — R63.4 LOSS OF WEIGHT: ICD-10-CM

## 2022-06-22 DIAGNOSIS — Z00.00 ROUTINE GENERAL MEDICAL EXAMINATION AT A HEALTH CARE FACILITY: ICD-10-CM

## 2022-06-22 DIAGNOSIS — Z80.2: ICD-10-CM

## 2022-06-22 DIAGNOSIS — N28.9 RENAL LESION: ICD-10-CM

## 2022-06-22 DIAGNOSIS — R91.8 PULMONARY NODULES: ICD-10-CM

## 2022-06-22 DIAGNOSIS — J30.2 SEASONAL ALLERGIC RHINITIS, UNSPECIFIED TRIGGER: ICD-10-CM

## 2022-06-22 DIAGNOSIS — Z00.00 ANNUAL PHYSICAL EXAM: ICD-10-CM

## 2022-06-22 DIAGNOSIS — R10.13 ABDOMINAL PAIN, EPIGASTRIC: ICD-10-CM

## 2022-06-22 DIAGNOSIS — R63.4 WEIGHT LOSS: ICD-10-CM

## 2022-06-22 DIAGNOSIS — R53.82 CHRONIC FATIGUE SYNDROME: ICD-10-CM

## 2022-06-22 DIAGNOSIS — G43.009 MIGRAINE WITHOUT AURA AND WITHOUT STATUS MIGRAINOSUS, NOT INTRACTABLE: Primary | ICD-10-CM

## 2022-06-22 DIAGNOSIS — R53.82 CHRONIC FATIGUE: Primary | ICD-10-CM

## 2022-06-22 DIAGNOSIS — R10.13 DYSPEPSIA: ICD-10-CM

## 2022-06-22 PROBLEM — Z80.8: Status: ACTIVE | Noted: 2022-06-22

## 2022-06-22 LAB
ALBUMIN SERPL BCP-MCNC: 4.5 G/DL (ref 3.5–5)
ALP SERPL-CCNC: 54 U/L (ref 46–116)
ALT SERPL W P-5'-P-CCNC: 30 U/L (ref 12–78)
ANION GAP SERPL CALCULATED.3IONS-SCNC: 6 MMOL/L (ref 4–13)
AST SERPL W P-5'-P-CCNC: 18 U/L (ref 5–45)
BACTERIA UR QL AUTO: NORMAL /HPF
BASOPHILS # BLD AUTO: 0.01 THOUSANDS/ΜL (ref 0–0.1)
BASOPHILS NFR BLD AUTO: 0 % (ref 0–1)
BILIRUB SERPL-MCNC: 0.55 MG/DL (ref 0.2–1)
BILIRUB UR QL STRIP: NEGATIVE
BUN SERPL-MCNC: 14 MG/DL (ref 5–25)
CALCIUM SERPL-MCNC: 9.8 MG/DL (ref 8.3–10.1)
CHLORIDE SERPL-SCNC: 107 MMOL/L (ref 100–108)
CHOLEST SERPL-MCNC: 152 MG/DL
CLARITY UR: CLEAR
CO2 SERPL-SCNC: 24 MMOL/L (ref 21–32)
COLOR UR: NORMAL
CREAT SERPL-MCNC: 0.98 MG/DL (ref 0.6–1.3)
EOSINOPHIL # BLD AUTO: 0.12 THOUSAND/ΜL (ref 0–0.61)
EOSINOPHIL NFR BLD AUTO: 3 % (ref 0–6)
ERYTHROCYTE [DISTWIDTH] IN BLOOD BY AUTOMATED COUNT: 11.9 % (ref 11.6–15.1)
GFR SERPL CREATININE-BSD FRML MDRD: 105 ML/MIN/1.73SQ M
GLUCOSE P FAST SERPL-MCNC: 91 MG/DL (ref 65–99)
GLUCOSE UR STRIP-MCNC: NEGATIVE MG/DL
HCT VFR BLD AUTO: 44.6 % (ref 36.5–49.3)
HCV AB SER QL: NORMAL
HDLC SERPL-MCNC: 40 MG/DL
HGB BLD-MCNC: 15.2 G/DL (ref 12–17)
HGB UR QL STRIP.AUTO: NEGATIVE
IMM GRANULOCYTES # BLD AUTO: 0.01 THOUSAND/UL (ref 0–0.2)
IMM GRANULOCYTES NFR BLD AUTO: 0 % (ref 0–2)
KETONES UR STRIP-MCNC: NEGATIVE MG/DL
LDLC SERPL CALC-MCNC: 99 MG/DL (ref 0–100)
LEUKOCYTE ESTERASE UR QL STRIP: NEGATIVE
LYMPHOCYTES # BLD AUTO: 1.17 THOUSANDS/ΜL (ref 0.6–4.47)
LYMPHOCYTES NFR BLD AUTO: 33 % (ref 14–44)
MCH RBC QN AUTO: 30.6 PG (ref 26.8–34.3)
MCHC RBC AUTO-ENTMCNC: 34.1 G/DL (ref 31.4–37.4)
MCV RBC AUTO: 90 FL (ref 82–98)
MONOCYTES # BLD AUTO: 0.37 THOUSAND/ΜL (ref 0.17–1.22)
MONOCYTES NFR BLD AUTO: 10 % (ref 4–12)
NEUTROPHILS # BLD AUTO: 1.92 THOUSANDS/ΜL (ref 1.85–7.62)
NEUTS SEG NFR BLD AUTO: 54 % (ref 43–75)
NITRITE UR QL STRIP: NEGATIVE
NON-SQ EPI CELLS URNS QL MICRO: NORMAL /HPF
NONHDLC SERPL-MCNC: 112 MG/DL
NRBC BLD AUTO-RTO: 0 /100 WBCS
PH UR STRIP.AUTO: 7 [PH]
PLATELET # BLD AUTO: 269 THOUSANDS/UL (ref 149–390)
PMV BLD AUTO: 10.3 FL (ref 8.9–12.7)
POTASSIUM SERPL-SCNC: 4.4 MMOL/L (ref 3.5–5.3)
PROT SERPL-MCNC: 8.3 G/DL (ref 6.4–8.2)
PROT UR STRIP-MCNC: NEGATIVE MG/DL
RBC # BLD AUTO: 4.96 MILLION/UL (ref 3.88–5.62)
RBC #/AREA URNS AUTO: NORMAL /HPF
SODIUM SERPL-SCNC: 137 MMOL/L (ref 136–145)
SP GR UR STRIP.AUTO: 1.02 (ref 1–1.03)
TRIGL SERPL-MCNC: 64 MG/DL
TSH SERPL DL<=0.05 MIU/L-ACNC: 1.38 UIU/ML (ref 0.45–4.5)
UROBILINOGEN UR STRIP-ACNC: <2 MG/DL
WBC # BLD AUTO: 3.6 THOUSAND/UL (ref 4.31–10.16)
WBC #/AREA URNS AUTO: NORMAL /HPF

## 2022-06-22 PROCEDURE — 82785 ASSAY OF IGE: CPT

## 2022-06-22 PROCEDURE — 86003 ALLG SPEC IGE CRUDE XTRC EA: CPT

## 2022-06-22 PROCEDURE — 86803 HEPATITIS C AB TEST: CPT

## 2022-06-22 PROCEDURE — 84443 ASSAY THYROID STIM HORMONE: CPT

## 2022-06-22 PROCEDURE — 85025 COMPLETE CBC W/AUTO DIFF WBC: CPT

## 2022-06-22 PROCEDURE — 80061 LIPID PANEL: CPT

## 2022-06-22 PROCEDURE — 86008 ALLG SPEC IGE RECOMB EA: CPT

## 2022-06-22 PROCEDURE — 1036F TOBACCO NON-USER: CPT | Performed by: INTERNAL MEDICINE

## 2022-06-22 PROCEDURE — 3008F BODY MASS INDEX DOCD: CPT | Performed by: INTERNAL MEDICINE

## 2022-06-22 PROCEDURE — 81001 URINALYSIS AUTO W/SCOPE: CPT

## 2022-06-22 PROCEDURE — 36415 COLL VENOUS BLD VENIPUNCTURE: CPT

## 2022-06-22 PROCEDURE — 99215 OFFICE O/P EST HI 40 MIN: CPT | Performed by: INTERNAL MEDICINE

## 2022-06-22 PROCEDURE — 80053 COMPREHEN METABOLIC PANEL: CPT
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
## 2022-06-22 NOTE — TELEPHONE ENCOUNTER
Patient completed ordered labs today and saw results in myChart  Noticed that his white blood cell count was low  Patient's wife called to inquire if there is anything in particular that should be done regarding this result  Please advise

## 2022-06-23 ENCOUNTER — TELEPHONE (OUTPATIENT)
Dept: INTERNAL MEDICINE CLINIC | Age: 27
End: 2022-06-23

## 2022-06-23 DIAGNOSIS — J30.2 SEASONAL ALLERGIC RHINITIS, UNSPECIFIED TRIGGER: Primary | ICD-10-CM

## 2022-06-23 DIAGNOSIS — Z91.018 FOOD ALLERGY: ICD-10-CM

## 2022-06-23 LAB
A ALTERNATA IGE QN: <0.1 KUA/I
A FUMIGATUS IGE QN: 0.25 KUA/I
A-LACTALB IGE QN: 0.18 KAU/I
ALMOND IGE QN: 6.3 KUA/I
ARA H6 PEANUT: <0.1 KUA/I
B-LACTOGLOB IGE QN: 0.11 KAU/I
BERMUDA GRASS IGE QN: 7.74 KUA/I
BOXELDER IGE QN: 4.01 KUA/I
C HERBARUM IGE QN: <0.1 KUA/I
CASEIN IGE QN: <0.1 KAU/I
CASHEW NUT IGE QN: 0.12 KUA/I
CAT DANDER IGE QN: 8.44 KUA/I
CMN PIGWEED IGE QN: 1.77 KUA/I
CODFISH IGE QN: 0.37 KUA/I
COMMON RAGWEED IGE QN: 8.03 KUA/I
COTTONWOOD IGE QN: 2.11 KUA/I
D FARINAE IGE QN: 58.4 KUA/I
D PTERONYSS IGE QN: 51.2 KUA/I
DOG DANDER IGE QN: 2.77 KUA/I
EGG WHITE IGE QN: 0.26 KUA/I
GLUTEN IGE QN: 0.55 KUA/I
HAZELNUT IGE QN: 23.3 KUA/L
LONDON PLANE IGE QN: 2.8 KUA/I
MILK IGE QN: 0.21 KUA/I
MOUSE URINE PROT IGE QN: <0.1 KUA/I
MT JUNIPER IGE QN: 5.08 KUA/I
MUGWORT IGE QN: 3 KUA/I
OVALB IGE QN: 0.18 KAU/I
OVOMUCOID IGE QN: <0.1 KAU/I
P NOTATUM IGE QN: 0.71 KUA/I
PEANUT (RARA H) 1 IGE QN: <0.1 KUA/I
PEANUT (RARA H) 2 IGE QN: <0.1 KUA/I
PEANUT (RARA H) 3 IGE QN: <0.1 KUA/I
PEANUT (RARA H) 8 IGE QN: 8.51 KUA/I
PEANUT (RARA H) 9 IGE QN: 2.65 KUA/I
PEANUT IGE QN: 5.63 KUA/I
ROACH IGE QN: 38.4 KUA/I
SALMON IGE QN: 0.28 KUA/I
SCALLOP IGE QN: 48.8 KUA/L
SESAME SEED IGE QN: 3.69 KUA/I
SHEEP SORREL IGE QN: 1.91 KUA/I
SHRIMP IGE QN: >100 KUA/L
SILVER BIRCH IGE QN: 20.7 KUA/I
SOYBEAN IGE QN: 1.59 KUA/I
TIMOTHY IGE QN: 32.5 KUA/I
TOTAL IGE SMQN RAST: 1497 KU/L (ref 0–113)
TOTAL IGE SMQN RAST: 1591 KU/L (ref 0–113)
TUNA IGE QN: 2.05 KUA/I
WALNUT IGE QN: 3.65 KUA/I
WALNUT IGE QN: 9.21 KUA/I
WHEAT IGE QN: 2.28 KUA/I
WHITE ASH IGE QN: 2.7 KUA/I
WHITE ELM IGE QN: 7.59 KUA/I
WHITE MULBERRY IGE QN: 3.75 KUA/I
WHITE OAK IGE QN: 18 KUA/I

## 2022-06-23 NOTE — TELEPHONE ENCOUNTER
Patient was able to get in for him to see an allergist       [facility]   Phone [phone]  Fax# [phone]    DOS 06/27/2022    Dx: [id number], [id number]

## 2022-06-23 NOTE — TELEPHONE ENCOUNTER
I called and discussed the results with patient and his wife  He has an appointment with Allergy and immunology next week and I counseled him to stay on the Fexofenadine, Singulair and to  some Flonase as well

## 2022-06-23 NOTE — TELEPHONE ENCOUNTER
Patient's wife Cheryl Coombs is calling regarding the abnormal blood work for the allergies that he had done      Patient was told that there is a referral in his chart for him to set up the appointment for him to see the allergist   I also stated that he should take the allergy medication that Dr Idalmis Steele prescribed for him per her request     She still would like to have someone call her with the rest of the blood work results    Cell phone

## 2022-06-27 ENCOUNTER — HOSPITAL ENCOUNTER (OUTPATIENT)
Dept: RADIOLOGY | Age: 27
Discharge: HOME | End: 2022-06-27
Payer: COMMERCIAL

## 2022-06-27 ENCOUNTER — HOSPITAL ENCOUNTER (OUTPATIENT)
Dept: RADIOLOGY | Age: 27
Discharge: HOME | End: 2022-06-27
Attending: INTERNAL MEDICINE
Payer: COMMERCIAL

## 2022-06-27 DIAGNOSIS — N28.9 RENAL LESION: ICD-10-CM

## 2022-06-27 DIAGNOSIS — R63.4 WEIGHT LOSS: ICD-10-CM

## 2022-06-27 DIAGNOSIS — R10.13 DYSPEPSIA: ICD-10-CM

## 2022-06-27 DIAGNOSIS — Z80.2: ICD-10-CM

## 2022-06-27 DIAGNOSIS — R91.8 PULMONARY NODULES: ICD-10-CM

## 2022-06-27 DIAGNOSIS — R53.82 CHRONIC FATIGUE: ICD-10-CM

## 2022-06-27 PROCEDURE — 71250 CT THORAX DX C-: CPT

## 2022-06-27 PROCEDURE — 76770 US EXAM ABDO BACK WALL COMP: CPT

## 2022-06-27 PROCEDURE — G1004 CDSM NDSC: HCPCS

## 2022-07-07 ENCOUNTER — OFFICE VISIT (OUTPATIENT)
Dept: INTERNAL MEDICINE CLINIC | Age: 27
End: 2022-07-07
Payer: COMMERCIAL

## 2022-07-07 VITALS
TEMPERATURE: 97.7 F | DIASTOLIC BLOOD PRESSURE: 70 MMHG | HEART RATE: 52 BPM | HEIGHT: 69 IN | BODY MASS INDEX: 30.87 KG/M2 | SYSTOLIC BLOOD PRESSURE: 114 MMHG | WEIGHT: 208.4 LBS | OXYGEN SATURATION: 98 %

## 2022-07-07 DIAGNOSIS — Z88.9 MULTIPLE ALLERGIES: ICD-10-CM

## 2022-07-07 DIAGNOSIS — R00.1 BRADYCARDIA: ICD-10-CM

## 2022-07-07 DIAGNOSIS — R91.8 PULMONARY NODULES: ICD-10-CM

## 2022-07-07 DIAGNOSIS — N28.1 RENAL CYST, LEFT: Primary | ICD-10-CM

## 2022-07-07 DIAGNOSIS — G43.009 MIGRAINE WITHOUT AURA AND WITHOUT STATUS MIGRAINOSUS, NOT INTRACTABLE: ICD-10-CM

## 2022-07-07 DIAGNOSIS — J30.89 SEASONAL ALLERGIC RHINITIS DUE TO OTHER ALLERGIC TRIGGER: ICD-10-CM

## 2022-07-07 DIAGNOSIS — D72.818 OTHER DECREASED WHITE BLOOD CELL (WBC) COUNT: ICD-10-CM

## 2022-07-07 PROCEDURE — 99214 OFFICE O/P EST MOD 30 MIN: CPT | Performed by: INTERNAL MEDICINE

## 2022-07-07 PROCEDURE — 93000 ELECTROCARDIOGRAM COMPLETE: CPT | Performed by: INTERNAL MEDICINE

## 2022-07-07 RX ORDER — FLUTICASONE FUROATE 27.5 UG/1
2 SPRAY, METERED NASAL DAILY
COMMUNITY

## 2022-07-07 RX ORDER — SUMATRIPTAN 50 MG/1
50 TABLET, FILM COATED ORAL ONCE AS NEEDED
Qty: 9 TABLET | Refills: 0 | Status: SHIPPED | OUTPATIENT
Start: 2022-07-07

## 2022-07-07 RX ORDER — TOPIRAMATE 25 MG/1
25 CAPSULE, COATED PELLETS ORAL 2 TIMES DAILY
Qty: 90 CAPSULE | Refills: 1 | Status: SHIPPED | OUTPATIENT
Start: 2022-07-07

## 2022-07-07 NOTE — PROGRESS NOTES
Assessment/Plan:    Multiple allergies  -currently following with Allergy and immunology  -for likely allergy shots   -continue to follow with Allergy and immunology    Allergic rhinitis   -stable   -continue with fluticasone and fexofenadine  -continue follow with Allergy and immunology    Pulmonary nodules  -repeat CT scan done on June 27th, 2022 showed stable left lower lobe sub 6 mm noncalcified pulmonary nodules  -will repeat a CT scan in 12 months because of patient's family history of malignancy    Renal cyst   -ultrasound of the kidneys done on June 27th, 2022 showed a simple renal cyst  -will continue to monitor patient clinically and repeat imaging as needed    Migraine headaches  -patient headaches are concerning for possible cluster headaches versus migraine headaches   -will start him on maintenance treatment with topiramate  -will switch him from rizatriptan to sumatriptan which is likely more formulary for his insurance  -he was counseled to take multivitamins and keep well hydrated   -will refer him to Neurology    Bradycardia   -patient is occasionally dizzy  -he is not an athlete and was never was 1  -point of care EKG showed a heart rate of 46  -will order a Lyme antibody test especially since he has lived in [place] his whole life  -will avoid beta-blockers and calcium channel blockers  -will refer him to Cardiology    Neutropenia  -CBC done on June 22nd, 2022 showed leukopenia with WBC of 3 60  -will repeat a CBC in about 2 weeks     Diagnoses and all orders for this visit:    Renal cyst, left    Multiple allergies    Seasonal allergic rhinitis due to other allergic trigger    Pulmonary nodules    Migraine without aura and without status migrainosus, not intractable  -     Ambulatory Referral to Neurology; Future  -     topiramate (TOPAMAX) 25 mg sprinkle capsule;  Take 1 capsule (25 mg total) by mouth 2 (two) times a day - start with 1 cap at night for a week and then 1 twice daily  - SUMAtriptan (Imitrex) 50 mg tablet; Take 1 tablet (50 mg total) by mouth once as needed for migraine for up to 1 dose    Other decreased white blood cell (WBC) count  -     CBC and differential; Future  -     CBC and differential    Bradycardia  -     POCT ECG    Other orders  -     fluticasone (Flonase Sensimist) 27 5 MCG/SPRAY nasal spray; 2 sprays into each nostril daily          Subjective:      Patient ID: Chuck Caballero is a 33 y o  male  HPI  Patient presents with his wife for a follow-up visit regarding allergies, migraine headaches, renal and pulmonary lesions  Of note, he had labs done that showed leukopenia and also had imaging done for his retinal lesion on his lung lesions and wants to review the results  He was seen by an allergist also  He states that he has been living in [place] all his life and symptoms  Started around age 26  Saw allergist and they want to start him on allergy shots  Thinking of moving soon  He is more worried about his migraine  He has migraines 2-3 times a day  migraines - start at the left side of the top of the head  and usually spreads to the rest of the head  With associated tearing from the left eye, nausea, photophobia and phonophobia  Headache is usually throbbing and a 10/10 at the worst  Motrin/tylenol sometimes help but not always  Of note, he states that the migraines can resolve in under an hour and another 1 will start later  He does not know if he has an aura but states that he usually knows that the headache is coming because it starts at the left temple  They state that the prescribed rizatriptan is very expensive and would like to switch to another migraine medication and may be have a maintenance treatment for the migraine headaches  Of note, patient's wife states that his heart rate sometimes falls very low when he is sleeping  Patient admits to occasional dizziness        The following portions of the patient's history were reviewed and updated as appropriate:   He  has a past medical history of Irregular heartbeat  He   Patient Active Problem List    Diagnosis Date Noted    Multiple allergies 07/07/2022    Family history of intrathoracic malignancy 06/22/2022    Chronic fatigue 06/22/2022    Weight loss 06/22/2022    Dyspepsia 06/22/2022    Renal cyst, left 06/22/2022    Pulmonary nodules 06/22/2022    Seasonal allergic rhinitis 04/12/2021    Migraine without status migrainosus, not intractable 04/12/2021    Exposure to COVID-19 virus 06/08/2020    Generalized anxiety disorder 06/07/2016     He  has no past surgical history on file  His family history includes Anxiety disorder in his mother; Coronary artery disease in his maternal grandmother; Lung cancer in his maternal grandmother; Pancreatic cancer in his cousin; Prostate cancer in his maternal grandfather; Thyroid cancer in his cousin  He  reports that he has never smoked  He has never used smokeless tobacco  He reports current alcohol use  He reports that he does not use drugs  Current Outpatient Medications   Medication Sig Dispense Refill    fexofenadine (ALLEGRA) 180 MG tablet Take 180 mg by mouth daily      fluticasone (Flonase Sensimist) 27 5 MCG/SPRAY nasal spray 2 sprays into each nostril daily      SUMAtriptan (Imitrex) 50 mg tablet Take 1 tablet (50 mg total) by mouth once as needed for migraine for up to 1 dose 9 tablet 0    topiramate (TOPAMAX) 25 mg sprinkle capsule Take 1 capsule (25 mg total) by mouth 2 (two) times a day - start with 1 cap at night for a week and then 1 twice daily 90 capsule 1    montelukast (SINGULAIR) 10 mg tablet Take 1 tablet (10 mg total) by mouth daily at bedtime (Patient not taking: Reported on 7/7/2022) 30 tablet 5     No current facility-administered medications for this visit       Current Outpatient Medications on File Prior to Visit   Medication Sig    fexofenadine (ALLEGRA) 180 MG tablet Take 180 mg by mouth daily    fluticasone (Flonase Sensimist) 27 5 MCG/SPRAY nasal spray 2 sprays into each nostril daily    [DISCONTINUED] rizatriptan (Maxalt) 10 mg tablet Take 1 tablet (10 mg total) by mouth as needed for migraine Take at the onset of migraine; if symptoms continue or return, may take another dose at least 2 hours after first dose  Take no more than 2 doses in a day   montelukast (SINGULAIR) 10 mg tablet Take 1 tablet (10 mg total) by mouth daily at bedtime (Patient not taking: Reported on 7/7/2022)     No current facility-administered medications on file prior to visit  He is allergic to other       Review of Systems   Constitutional: Negative for activity change, chills, fatigue, fever and unexpected weight change  HENT: Positive for congestion and rhinorrhea  Negative for ear pain, postnasal drip, sinus pressure and sore throat  Eyes: Positive for photophobia (With Migraine headaches)  Negative for pain  Respiratory: Negative for cough, choking, chest tightness, shortness of breath and wheezing  Cardiovascular: Negative for chest pain, palpitations and leg swelling  Gastrointestinal: Positive for nausea  Negative for abdominal pain, constipation, diarrhea and vomiting  Genitourinary: Negative for dysuria and hematuria  Musculoskeletal: Negative for arthralgias, back pain, gait problem, joint swelling, myalgias and neck stiffness  Skin: Negative for pallor and rash  Neurological: Positive for dizziness and headaches  Negative for tremors, seizures, syncope and light-headedness  Hematological: Negative for adenopathy  Psychiatric/Behavioral: Negative for behavioral problems  Objective:      /70 (BP Location: Left arm, Patient Position: Sitting, Cuff Size: Standard)   Pulse (!) 52   Temp 97 7 °F (36 5 °C) (Temporal)   Ht 5' 9" (1 753 m)   Wt 94 5 kg (208 lb 6 4 oz)   SpO2 98%   BMI 30 78 kg/m²          Physical Exam  Constitutional:       General: He is not in acute distress       Appearance: He is well-developed  He is not diaphoretic  HENT:      Head: Normocephalic and atraumatic  Right Ear: External ear normal       Left Ear: External ear normal       Nose: Mucosal edema and congestion present  Mouth/Throat:      Mouth: Mucous membranes are dry  Pharynx: Posterior oropharyngeal erythema (Oropharyngeal erythema with dry mucous membranes and signs of postnasal drip) present  No oropharyngeal exudate  Eyes:      General: No scleral icterus  Right eye: No discharge  Left eye: No discharge  Conjunctiva/sclera: Conjunctivae normal       Pupils: Pupils are equal, round, and reactive to light  Neck:      Thyroid: No thyromegaly  Vascular: No JVD  Trachea: No tracheal deviation  Cardiovascular:      Rate and Rhythm: Regular rhythm  Bradycardia present  Heart sounds: Normal heart sounds  No murmur heard  No friction rub  No gallop  Comments: Bradycardia with heart rate of fifty-two per minute  Pulmonary:      Effort: Pulmonary effort is normal  No respiratory distress  Breath sounds: Normal breath sounds  No wheezing or rales  Chest:      Chest wall: No tenderness  Abdominal:      General: Bowel sounds are normal  There is no distension  Palpations: Abdomen is soft  There is no mass  Tenderness: There is no abdominal tenderness  There is no guarding or rebound  Musculoskeletal:         General: No tenderness or deformity  Normal range of motion  Cervical back: Normal range of motion and neck supple  Lymphadenopathy:      Cervical: No cervical adenopathy  Skin:     General: Skin is warm and dry  Coloration: Skin is not pale  Findings: No erythema or rash  Neurological:      Mental Status: He is alert and oriented to person, place, and time  Cranial Nerves: No cranial nerve deficit  Motor: No abnormal muscle tone        Coordination: Coordination normal       Deep Tendon Reflexes: Reflexes are normal and symmetric  Psychiatric:         Behavior: Behavior normal            Appointment on 06/22/2022   Component Date Value Ref Range Status    A  ALTERNATA 06/22/2022 <0 10  0 00 - 0 09 kUA/I Final    A  FUMIGATUS 06/22/2022 0 25 (A) 0 00 - 0 09 kUA/I Final    [place] Grass 06/22/2022 7 74 (A) 0 00 - 0 09 kUA/I Final    Box Elder  06/22/2022 4 01 (A) 0 00 - 0 09 kUA/I Final    Cat Epithellium-Dander 06/22/2022 8 44 (A) 0 00 - 0 09 kUA/I Final    C  HERBARUM 06/22/2022 <0 10  0 00 - 0 09 kUA/I Final    Cockroach 06/22/2022 38 40 (A) 0 00 - 0 09 kUA/I Final    Common Silver [place] 06/22/2022 20 70 (A) 0 00 - 0 09 kUA/I Final    [place] 06/22/2022 2 11 (A) 0 00 - 0 09 kUA/I Final    D  farinae 06/22/2022 58 40 (A) 0 00 - 0 09 kUA/I Final    D  pteronyssinus 06/22/2022 51 20 (A) 0 00 - 0 09 kUA/I Final    Dog Dander 06/22/2022 2 77 (A) 0 00 - 0 09 kUA/I Final    Elm IgE 06/22/2022 7 59 (A) 0 00 - 0 09 kUA/I Final   [facility] Tree 06/22/2022 5 08 (A) 0 00 - 0 09 kUA/I Final    Mugwort 06/22/2022 3 00 (A) 0 00 - 0 09 kUA/I Final    [place] Tree 06/22/2022 3 75 (A) 0 00 - 0 09 kUA/I Final    [place] 06/22/2022 18 00 (A) 0 00 - 0 09 kUA/I Final    P CHRYSOGENUM 06/22/2022 0 71 (A) 0 00 - 0 09 kUA/I Final    Rough Pigweed  IgE 06/22/2022 1 77 (A) 0 00 - 0 09 kUA/I Final    Common Ragweed 06/22/2022 8 03 (A) 0 00 - 0 09 kUA/I Final    Sheep [place] IgE 06/22/2022 1 91 (A) 0 00 - 0 09 kUA/I Final    [place] Tree 06/22/2022 2 80 (A) 0 00 - 0 09 kUA/I Final    Flex Grass 06/22/2022 32 50 (A) 0 00 - 0 09 kUA/I Final    [facility] Tree 06/22/2022 3 65 (A) 0 00 - 0 09 kUA/I Final    [place] Tree 06/22/2022 2 70 (A) 0 00 - 0 09 kUA/I Final    IgE 06/22/2022 1,591 (A) 0 - 113 kU/l Final    MOUSE URINE 06/22/2022 <0 10  0 00 - 0 09 kUA/I Final    Color, UA 06/22/2022 Light Yellow   Final    Clarity, UA 06/22/2022 Clear   Final    Specific Gravity, UA 06/22/2022 1 019  1 003 - 1 030 Final    pH, UA 06/22/2022 7 0  4 5, 5 0, 5 5, 6 0, 6 5, 7 0, 7 5, 8 0 Final    Leukocytes, UA 06/22/2022 Negative  Negative Final    Nitrite, UA 06/22/2022 Negative  Negative Final    Protein, UA 06/22/2022 Negative  Negative mg/dl Final    Glucose, UA 06/22/2022 Negative  Negative mg/dl Final    Ketones, UA 06/22/2022 Negative  Negative mg/dl Final    Urobilinogen, UA 06/22/2022 <2 0  <2 0 mg/dl mg/dl Final    Bilirubin, UA 06/22/2022 Negative  Negative Final    Occult Blood, UA 06/22/2022 Negative  Negative Final    RBC, UA 06/22/2022 1-2  None Seen, 1-2 /hpf Final    WBC, UA 06/22/2022 1-2  None Seen, 1-2 /hpf Final    Epithelial Cells 06/22/2022 Occasional  None Seen, Occasional /hpf Final    Bacteria, UA 06/22/2022 Occasional  None Seen, Occasional /hpf Final    Hepatitis C Ab 06/22/2022 Non-reactive  Non-reactive Final    WBC 06/22/2022 3 60 (A) 4 31 - 10 16 Thousand/uL Final    RBC 06/22/2022 4 96  3 88 - 5 62 Million/uL Final    Hemoglobin 06/22/2022 15 2  12 0 - 17 0 g/dL Final    Hematocrit 06/22/2022 44 6  36 5 - 49 3 % Final    MCV 06/22/2022 90  82 - 98 fL Final    MCH 06/22/2022 30 6  26 8 - 34 3 pg Final    MCHC 06/22/2022 34 1  31 4 - 37 4 g/dL Final    RDW 06/22/2022 11 9  11 6 - 15 1 % Final    MPV 06/22/2022 10 3  8 9 - 12 7 fL Final    Platelets 55/01/8504 269  149 - 390 Thousands/uL Final    nRBC 06/22/2022 0  /100 WBCs Final    Neutrophils Relative 06/22/2022 54  43 - 75 % Final    Immat GRANS % 06/22/2022 0  0 - 2 % Final    Lymphocytes Relative 06/22/2022 33  14 - 44 % Final    Monocytes Relative 06/22/2022 10  4 - 12 % Final    Eosinophils Relative 06/22/2022 3  0 - 6 % Final    Basophils Relative 06/22/2022 0  0 - 1 % Final    Neutrophils Absolute 06/22/2022 1 92  1 85 - 7 62 Thousands/µL Final    Immature Grans Absolute 06/22/2022 0 01  0 00 - 0 20 Thousand/uL Final    Lymphocytes Absolute 06/22/2022 1 17  0 60 - 4 47 Thousands/µL Final    Monocytes Absolute 06/22/2022 0  37  0 17 - 1 22 Thousand/µL Final    Eosinophils Absolute 06/22/2022 0 12  0 00 - 0 61 Thousand/µL Final    Basophils Absolute 06/22/2022 0 01  0 00 - 0 10 Thousands/µL Final    TSH 3RD GENERATON 06/22/2022 1 380  0 450 - 4 500 uIU/mL Final    Adult TSH (3rd generation) reference range follows the recommended guidelines of the American Thyroid Association, January, 2020   Sodium 06/22/2022 137  136 - 145 mmol/L Final    Potassium 06/22/2022 4 4  3 5 - 5 3 mmol/L Final    Chloride 06/22/2022 107  100 - 108 mmol/L Final    CO2 06/22/2022 24  21 - 32 mmol/L Final    ANION GAP 06/22/2022 6  4 - 13 mmol/L Final    BUN 06/22/2022 14  5 - 25 mg/dL Final    Creatinine 06/22/2022 0 98  0 60 - 1 30 mg/dL Final    Standardized to IDMS reference method    Glucose, Fasting 06/22/2022 91  65 - 99 mg/dL Final    Specimen collection should occur prior to Sulfasalazine administration due to the potential for falsely depressed results  Specimen collection should occur prior to Sulfapyridine administration due to the potential for falsely elevated results   Calcium 06/22/2022 9 8  8 3 - 10 1 mg/dL Final    AST 06/22/2022 18  5 - 45 U/L Final    Specimen collection should occur prior to Sulfasalazine administration due to the potential for falsely depressed results   ALT 06/22/2022 30  12 - 78 U/L Final    Specimen collection should occur prior to Sulfasalazine and/or Sulfapyridine administration due to the potential for falsely depressed results   Alkaline Phosphatase 06/22/2022 54  46 - 116 U/L Final    Total Protein 06/22/2022 8 3 (A) 6 4 - 8 2 g/dL Final    Albumin 06/22/2022 4 5  3 5 - 5 0 g/dL Final    Total Bilirubin 06/22/2022 0 55  0 20 - 1 00 mg/dL Final    Use of this assay is not recommended for patients undergoing treatment with eltrombopag due to the potential for falsely elevated results      eGFR 06/22/2022 105  ml/min/1 73sq m Final    Cholesterol 06/22/2022 152  See Comment mg/dL Final Cholesterol:         Pediatric <18 Years        Desirable          <170 mg/dL      Borderline High    170-199 mg/dL      High               >=200 mg/dL        Adult >=18 Years            Desirable         <200 mg/dL      Borderline High   200-239 mg/dL      High              >239 mg/dL      Triglycerides 06/22/2022 64  See Comment mg/dL Final    Triglyceride:     0-9Y            <75mg/dL     10Y-17Y         <90 mg/dL       >=18Y     Normal          <150 mg/dL     Borderline High 150-199 mg/dL     High            200-499 mg/dL        Very High       >499 mg/dL    Specimen collection should occur prior to N-Acetylcysteine or Metamizole administration due to the potential for falsely depressed results   HDL, Direct 06/22/2022 40  >=40 mg/dL Final    Specimen collection should occur prior to Metamizole administration due to the potential for falsley depressed results   LDL Calculated 06/22/2022 99  0 - 100 mg/dL Final    LDL Cholesterol:     Optimal           <100 mg/dl     Near Optimal      100-129 mg/dl     Above Optimal       Borderline High 130-159 mg/dl       High            160-189 mg/dl       Very High       >189 mg/dl         This screening LDL is a calculated result  It does not have the accuracy of the Direct Measured LDL in the monitoring of patients with hyperlipidemia and/or statin therapy  Direct Measure LDL (QFI176) must be ordered separately in these patients      Non-HDL-Chol (CHOL-HDL) 06/22/2022 112  mg/dl Final    Fish Cod 06/22/2022 0 37 (A) 0 00 - 0 09 kUA/I Final    Egg White 06/22/2022 0 26 (A) 0 00 - 0 09 kUA/I Final    Gluten 06/22/2022 0 55 (A) 0 00 - 0 09 kUA/I Final    Milk, Cow's 06/22/2022 0 21 (A) 0 00 - 0 09 kUA/I Final    Peanut 06/22/2022 5 63 (A) 0 00 - 0 09 kUA/I Final    [place] 06/22/2022 0 28 (A) 0 00 - 0 09 kUA/I Final    Scallop IgE 06/22/2022 48 80 (A) 0 00 - 0 09 kUA/l Final    Sesame Seed IgE 06/22/2022 3 69 (A) 0 00 - 0 09 kUA/I Final    Shrimp 06/22/2022 >100 00 (A) 0 00 - 0 09 kUA/l Final    SOYBEAN 06/22/2022 1 59 (A) 0 00 - 0 09 kUA/I Final    Tuna IgE 06/22/2022 2 05 (A) 0 00 - 0 09 kUA/I Final    [facility] 06/22/2022 9 21 (A) 0 00 - 0 09 kUA/I Final    Wheat 06/22/2022 2 28 (A) 0 00 - 0 09 kUA/I Final    Almonds 06/22/2022 6 30 (A) 0 00 - 0 09 kUA/I Final    Cashew 06/22/2022 0 12 (A) 0 00 - 0 09 kUA/I Final    Hazelnut 06/22/2022 23 30 (A) 0 00 - 0 09 kUA/l Final    IgE 06/22/2022 1,497 (A) 0 - 113 kU/l Final    Alpha Lactalbumin 06/22/2022 0 18 (A) 0 00 - 0 09 kAU/I Final    Beta Lactoglobulin 06/22/2022 0 11 (A) 0 00 - 0 09 kAU/I Final    Casein 06/22/2022 <0 10  0 00 - 0 09 kAU/I Final    PAULA h1 Peanut 06/22/2022 <0 10  0 00 - 0 09 kUA/I Final    PAULA h2 Peanut 06/22/2022 <0 10  0 00 - 0 09 kUA/I Final    PAULA h3 Peanut 06/22/2022 <0 10  0 00 - 0 09 kUA/I Final    PAULA h6 Peanut 06/22/2022 <0 10  0 00 - 0 09 kUA/I Final    PAULA h8 Peanut 06/22/2022 8 51 (A) 0 00 - 0 09 kUA/I Final    PAULA h9 Peanut 06/22/2022 2 65 (A) 0 00 - 0 09 kUA/I Final    F232 Ovalbumin 06/22/2022 0 18 (A) 0 00 - 0 09 kAU/I Final    F233 Ovomucoid 06/22/2022 <0 10  0 00 - 0 09 kAU/I Final   Telemedicine on 01/31/2022   Component Date Value Ref Range Status    SARS-CoV-2 01/31/2022 Positive (A) Negative Final    INFLUENZA A PCR 01/31/2022 Negative  Negative Final    INFLUENZA B PCR 01/31/2022 Negative  Negative Final   Orders Only on 08/16/2021   Component Date Value Ref Range Status    SARS-CoV-2 08/16/2021 Positive (A) Negative Final

## 2022-07-07 NOTE — LETTER
July 7, 2022     Patient: Ranjit Marti  YOB: 1995  Date of Visit: 7/7/2022      To Whom it May Concern:    Ranjit Marti is under my professional care  Ashutosh May was seen in my office on 7/7/2022  Ashutosh May may return to work on July 8th, 2022  If you have any questions or concerns, please don't hesitate to call           Sincerely,          Chey Leon DO        CC: No Recipients

## 2022-07-13 ENCOUNTER — TELEMEDICINE (OUTPATIENT)
Dept: INTERNAL MEDICINE CLINIC | Age: 27
End: 2022-07-13
Payer: COMMERCIAL

## 2022-07-13 DIAGNOSIS — U07.1 COVID-19 VIRUS INFECTION: Primary | ICD-10-CM

## 2022-07-13 PROCEDURE — 99213 OFFICE O/P EST LOW 20 MIN: CPT | Performed by: STUDENT IN AN ORGANIZED HEALTH CARE EDUCATION/TRAINING PROGRAM

## 2022-07-13 RX ORDER — ACETAMINOPHEN 160 MG
2000 TABLET,DISINTEGRATING ORAL DAILY
Qty: 30 CAPSULE | Refills: 0 | Status: SHIPPED | OUTPATIENT
Start: 2022-07-13

## 2022-07-13 NOTE — LETTER
July 13, 2022     Patient: Qiana Cervantes  YOB: 1995  Date of Visit: 7/13/2022      To Whom it May Concern:    Qinaa Cervantes is under my professional care  Lawsonconrado Cruz was seen in my office on 7/13/2022  Renny Cruz may return to work on 7/18/2022  If you have any questions or concerns, please don't hesitate to call           Sincerely,          Jessica Munoz MD        CC: No Recipients

## 2022-07-13 NOTE — PATIENT INSTRUCTIONS
Please continue to isolate until 10 days after symptom onset  I have written a work note for you to return on 7/18/2022  If you need to go out in public before then, please wear a mask  For worsening severe symptoms including persistent fever, SOB, inability to eat/drink, please go to the ED  Please get your COVID booster when you are feeling better

## 2022-07-18 ENCOUNTER — OFFICE VISIT (OUTPATIENT)
Dept: URGENT CARE | Age: 27
End: 2022-07-18
Payer: COMMERCIAL

## 2022-07-18 VITALS
SYSTOLIC BLOOD PRESSURE: 147 MMHG | OXYGEN SATURATION: 99 % | RESPIRATION RATE: 16 BRPM | TEMPERATURE: 97.4 F | DIASTOLIC BLOOD PRESSURE: 74 MMHG | HEART RATE: 83 BPM

## 2022-07-18 DIAGNOSIS — Z91.89 RISK OF EXPOSURE TO LYME DISEASE: Primary | ICD-10-CM

## 2022-07-18 PROCEDURE — 99213 OFFICE O/P EST LOW 20 MIN: CPT | Performed by: STUDENT IN AN ORGANIZED HEALTH CARE EDUCATION/TRAINING PROGRAM

## 2022-07-18 RX ORDER — DOXYCYCLINE 100 MG/1
100 CAPSULE ORAL 2 TIMES DAILY
Qty: 28 CAPSULE | Refills: 0 | Status: SHIPPED | OUTPATIENT
Start: 2022-07-18 | End: 2022-08-01

## 2022-07-18 NOTE — PROGRESS NOTES
[facility]        NAME: Lizette Sanchez is a 33 y o  male  : 1995    MRN: [id number]  DATE: 2022  TIME: 7:27 PM    Assessment and Plan   Risk of exposure to Lyme disease [Z91 89]  1  Risk of exposure to Lyme disease  doxycycline monohydrate (MONODOX) 100 mg capsule         Patient Instructions       Follow up with PCP in 3-5 days  Proceed to  ER if symptoms worsen  Chief Complaint     Chief Complaint   Patient presents with    Mass     Lump on right side low back, redness, soft, patient states having intermittent bradycardia with heart rate reaching into the 30s, for 1 to 2 months, family member requesting lyme panel         History of Present Illness       HPI   Patient presents with family member was states there is a area of erythema and redness on his right lower back ongoing for the past few days  He has also been having heart issues with his heart rate going into the low 30s  For the past 1 or 2 months  Patient has blood work ordered, denies any fevers or chills      Review of Systems   Review of Systems  Per hpi     Current Medications       Current Outpatient Medications:     Cholecalciferol (Vitamin D3) 50 MCG (2000 UT) capsule, Take 1 capsule (2,000 Units total) by mouth daily, Disp: 30 capsule, Rfl: 0    doxycycline monohydrate (MONODOX) 100 mg capsule, Take 1 capsule (100 mg total) by mouth 2 (two) times a day for 14 days, Disp: 28 capsule, Rfl: 0    fexofenadine (ALLEGRA) 180 MG tablet, Take 180 mg by mouth daily, Disp: , Rfl:     fluticasone (Flonase Sensimist) 27 5 MCG/SPRAY nasal spray, 2 sprays into each nostril daily, Disp: , Rfl:     SUMAtriptan (Imitrex) 50 mg tablet, Take 1 tablet (50 mg total) by mouth once as needed for migraine for up to 1 dose, Disp: 9 tablet, Rfl: 0    montelukast (SINGULAIR) 10 mg tablet, Take 1 tablet (10 mg total) by mouth daily at bedtime (Patient not taking: No sig reported), Disp: 30 tablet, Rfl: 5    topiramate (TOPAMAX) 25 mg sprinkle capsule, Take 1 capsule (25 mg total) by mouth 2 (two) times a day - start with 1 cap at night for a week and then 1 twice daily (Patient not taking: No sig reported), Disp: 90 capsule, Rfl: 1    Current Allergies     Allergies as of 07/18/2022 - Reviewed 07/18/2022   Allergen Reaction Noted    Other Itching 06/08/2020            The following portions of the patient's history were reviewed and updated as appropriate: allergies, current medications, past family history, past medical history, past social history, past surgical history and problem list      Past Medical History:   Diagnosis Date    Irregular heartbeat     Last Assessed [id number]       History reviewed  No pertinent surgical history  Family History   Problem Relation Age of Onset    Anxiety disorder Mother     Coronary artery disease Maternal Grandmother     Lung cancer Maternal Grandmother     Prostate cancer Maternal Grandfather     Thyroid cancer Cousin     Pancreatic cancer Cousin          Medications have been verified  Objective   /74   Pulse 83   Temp (!) 97 4 °F (36 3 °C)   Resp 16   SpO2 99%   No LMP for male patient  Physical Exam     Physical Exam  Constitutional:       General: He is not in acute distress  Appearance: He is well-developed  He is not diaphoretic  HENT:      Head: Normocephalic and atraumatic  Right Ear: Tympanic membrane and external ear normal       Left Ear: Tympanic membrane and external ear normal       Mouth/Throat:      Mouth: Mucous membranes are moist       Pharynx: Oropharynx is clear  No oropharyngeal exudate  Eyes:      Extraocular Movements: Extraocular movements intact  Conjunctiva/sclera: Conjunctivae normal    Cardiovascular:      Rate and Rhythm: Normal rate and regular rhythm  Heart sounds: Normal heart sounds  Pulmonary:      Effort: Pulmonary effort is normal  No respiratory distress  Breath sounds: Normal breath sounds  No wheezing  Abdominal:      General: Bowel sounds are normal  There is no distension  Palpations: Abdomen is soft  There is no mass  Tenderness: There is no abdominal tenderness  Musculoskeletal:         General: No swelling or tenderness  Cervical back: Normal range of motion  Right lower leg: No edema  Left lower leg: No edema  Lymphadenopathy:      Cervical: No cervical adenopathy  Skin:     General: Skin is warm  Findings: Rash present  Comments: 3 in diameter oval rash present right lumbar paraspinal, without central clearing, no tenderness   Neurological:      Mental Status: He is alert and oriented to person, place, and time

## 2022-07-18 NOTE — LETTER
July 18, 2022     Patient: Keyona Red   YOB: 1995   Date of Visit: 7/18/2022       To Whom It May Concern: It is my medical opinion that Keyona Red be excused from work duties on 07/18, 7/19, 7/20 and 7/21 of the year 2022  If you have any questions or concerns, please don't hesitate to call           Sincerely,        Dilia Finnegan MD    CC: No Recipients

## 2022-07-20 ENCOUNTER — TELEPHONE (OUTPATIENT)
Dept: CARDIOLOGY CLINIC | Facility: CLINIC | Age: 27
End: 2022-07-20

## 2022-07-20 ENCOUNTER — OFFICE VISIT (OUTPATIENT)
Dept: CARDIOLOGY CLINIC | Facility: CLINIC | Age: 27
End: 2022-07-20
Payer: COMMERCIAL

## 2022-07-20 VITALS
HEIGHT: 69 IN | HEART RATE: 57 BPM | BODY MASS INDEX: 30.81 KG/M2 | DIASTOLIC BLOOD PRESSURE: 82 MMHG | SYSTOLIC BLOOD PRESSURE: 100 MMHG | WEIGHT: 208 LBS

## 2022-07-20 DIAGNOSIS — R00.1 BRADYCARDIA: ICD-10-CM

## 2022-07-20 PROCEDURE — 99205 OFFICE O/P NEW HI 60 MIN: CPT | Performed by: INTERNAL MEDICINE

## 2022-07-20 PROCEDURE — 93000 ELECTROCARDIOGRAM COMPLETE: CPT | Performed by: INTERNAL MEDICINE

## 2022-07-20 NOTE — TELEPHONE ENCOUNTER
Kayy Adrian,  Are we good to put on a Zio XT (2 weeks) on patient with Dao Patel? No hook up needed, just interpretation      Thanks,   Jacqueline Fuentes

## 2022-07-20 NOTE — PROGRESS NOTES
EPS Consultation/New Patient Evaluation - Mireya Hameed 33 y o  male MRN: [id number]       Referring:HEMANTH Tan     CC/HPI:   It was a pleasure to see Mireya Hameed in our arrhythmia clinic at [place] [address]  As you know he is a 33 y o  man with prior COVID-19 infection, allergic rhinitis, pulmonary nodules, renal cyst, migraine headaches who presents to discuss management of bradycardia  Patient reports feeling dizziness on occasion  He had an EKG performed which showed a rate of 46 beats per minute  Lyme antibody test would order which has not been obtained  ECG performed in June of 2021 reveals sinus rhythm with rate of 80s  He presents with his wife who is the ICU nurse  His wife also provides part of the history  Patient reports having dizziness and fatigue that started about 2 months ago  He had taken 1 week off from work recently but continued to have episodes  He went to work today and had heart rate decreased down to 30s  He had seen his PCP recently and had come to office after walking  In the office his heart rate was noted to be in 40s  Patient does note on [employer] watch his heart rate would go up to 100 send come down to 40s  He did have COVID infection about a month ago but symptoms proceed it the viral infection  [place] but [place] and patient denied going in woods all the time  However wife did report their dog having tick bite  Patient also had rash on the back that eventually shrink it but they are not sure what caused it  Patient denies any new medications or any diet changes  However he does smoke marijuana daily  He denies any other drug use  He drinks energy drinks about once or twice a week  He denies significant alcohol use      Past Medical History:  Past Medical History:   Diagnosis Date    Irregular heartbeat     Last Assessed [id number]       Medications:      Current Outpatient Medications:     Cholecalciferol (Vitamin D3) 50 MCG (2000 UT) capsule, Take 1 capsule (2,000 Units total) by mouth daily, Disp: 30 capsule, Rfl: 0    doxycycline monohydrate (MONODOX) 100 mg capsule, Take 1 capsule (100 mg total) by mouth 2 (two) times a day for 14 days, Disp: 28 capsule, Rfl: 0    fexofenadine (ALLEGRA) 180 MG tablet, Take 180 mg by mouth daily, Disp: , Rfl:     fluticasone (Flonase Sensimist) 27 5 MCG/SPRAY nasal spray, 2 sprays into each nostril daily, Disp: , Rfl:     SUMAtriptan (Imitrex) 50 mg tablet, Take 1 tablet (50 mg total) by mouth once as needed for migraine for up to 1 dose, Disp: 9 tablet, Rfl: 0    montelukast (SINGULAIR) 10 mg tablet, Take 1 tablet (10 mg total) by mouth daily at bedtime (Patient not taking: No sig reported), Disp: 30 tablet, Rfl: 5    topiramate (TOPAMAX) 25 mg sprinkle capsule, Take 1 capsule (25 mg total) by mouth 2 (two) times a day - start with 1 cap at night for a week and then 1 twice daily (Patient not taking: No sig reported), Disp: 90 capsule, Rfl: 1     Family History   Problem Relation Age of Onset    Anxiety disorder Mother     Coronary artery disease Maternal Grandmother     Lung cancer Maternal Grandmother     Prostate cancer Maternal Grandfather     Thyroid cancer Cousin     Pancreatic cancer Cousin      Social History     Socioeconomic History    Marital status: /Civil Union     Spouse name: Not on file    Number of children: Not on file    Years of education: Not on file    Highest education level: Not on file   Occupational History    Not on file   Tobacco Use    Smoking status: Never Smoker    Smokeless tobacco: Never Used    Tobacco comment: non smoker   Vaping Use    Vaping Use: Never used   Substance and Sexual Activity    Alcohol use: Yes     Comment: very rare    Drug use: Never    Sexual activity: Not Currently   Other Topics Concern    Not on file   Social History Narrative    Not on file     Social Determinants of Health     Financial Resource Strain: Not on file   Food Insecurity: Not on file   Transportation Needs: Not on file   Physical Activity: Not on file   Stress: Not on file   Social Connections: Not on file   Intimate Partner Violence: Not on file   Housing Stability: Not on file     Social History     Tobacco Use   Smoking Status Never Smoker   Smokeless Tobacco Never Used   Tobacco Comment    non smoker     Social History     Substance and Sexual Activity   Alcohol Use Yes    Comment: very rare       Review of Systems   Constitutional: Positive for malaise/fatigue  Negative for chills and fever  HENT: Negative  Eyes: Negative for blurred vision and double vision  Cardiovascular: Negative for chest pain, dyspnea on exertion, leg swelling, near-syncope, orthopnea, palpitations, paroxysmal nocturnal dyspnea and syncope  Respiratory: Negative for cough and sputum production  Endocrine: Negative  Skin: Negative  Negative for rash  Musculoskeletal: Negative  Negative for arthritis and joint pain  Gastrointestinal: Negative for abdominal pain, nausea and vomiting  Genitourinary: Negative  Neurological: Positive for dizziness  Psychiatric/Behavioral: Negative  The patient is not nervous/anxious  Objective:     Vitals: Blood pressure 100/82, pulse 57, height 5' 9" (1 753 m), weight 94 3 kg (208 lb)  , Body mass index is 30 72 kg/m²  ,        Physical Exam:    GEN: Nora Kappa appears well, alert and oriented x 3, pleasant and cooperative   HEENT: pupils equal, round, and reactive to light; extraocular muscles intact  NECK: supple, no carotid bruits   HEART: regular rhythm but bradycardic, normal S1 and S2, no murmurs, clicks, gallops or rubs   LUNGS: clear to auscultation bilaterally; no wheezes, rales, or rhonchi   ABDOMEN: normal bowel sounds, soft, no tenderness, no distention  EXTREMITIES: peripheral pulses normal; no clubbing, cyanosis, or edema  NEURO: no focal findings   SKIN: normal without suspicious lesions on exposed skin      Labs & Results:  Below is the patient's most recent value for Albumin, ALT, AST, BUN, Calcium, Chloride, Cholesterol, CO2, Creatinine, GFR, Glucose, HDL, Hematocrit, Hemoglobin, Hemoglobin A1C, LDL, Magnesium, Phosphorus, Platelets, Potassium, PSA, Sodium, Triglycerides, and WBC  Lab Results   Component Value Date    ALT 30 06/22/2022    AST 18 06/22/2022    BUN 14 06/22/2022    CALCIUM 9 8 06/22/2022     06/22/2022    CHOL 144 04/11/2017    CO2 24 06/22/2022    CREATININE 0 98 06/22/2022    HDL 40 06/22/2022    HCT 44 6 06/22/2022    HGB 15 2 06/22/2022    HGBA1C 5 5 08/26/2019     06/22/2022    K 4 4 06/22/2022     04/11/2017    TRIG 64 06/22/2022    WBC 3 60 (L) 06/22/2022     Note: for a comprehensive list of the patient's lab results, access the Results Review activity  Cardiac testing:     I personally reviewed the ECG performed in the clinic on 07/20/22  It reveals sinus bradycardia at 57 beats per minute  Echocardiograms:  No results found for this or any previous visit  No results found for this or any previous visit  Catheterizations:   No results found for this or any previous visit  Stress Tests:  No results found for this or any previous visit  Holter monitor -   No results found for this or any previous visit  No results found for this or any previous visit  ASSESSMENT/PLAN:  1  Bradycardia  Patient is noted to have sinus bradycardia at PCP office  Also having fatigue and dizziness for the past 2 months  Unclear etiology  Lyme test is pending  Possible causes include infectious/viral infiltrative disease versus recreational drug related versus sinus node dysfunction  Will obtain 2 week cardiac event monitor to assess symptoms correlation with any bradycardia  Will also obtain echocardiogram due to fatigue and dizziness  Follow-up in 6 weeks    2   Migraine headaches  Patient reports having headache  Recommended obtaining blood pressure cuff at the time of headache to evaluate any change in blood pressure  Imitrex 50 mg as needed     This note was completed in part utilizing M-Modal Fluency Direct Software  Grammatical errors, random word insertions, spelling mistakes, and incomplete sentences can be an occasional consequence of this system secondary to software limitations, ambient noise, and hardware issues  If you have any questions or concerns about the content, text, or information contained within the body of this dictation, please contact the provider for clarification

## 2022-07-20 NOTE — PATIENT INSTRUCTIONS
Obtain an echocardiogram    We will place 2 week cardiac event monitor to evaluate your rhythm    Hold off taking marijuana while wearing the patch

## 2022-07-21 ENCOUNTER — TELEPHONE (OUTPATIENT)
Dept: CARDIOLOGY CLINIC | Facility: CLINIC | Age: 27
End: 2022-07-21

## 2022-07-21 NOTE — TELEPHONE ENCOUNTER
Per Dr Jess Bach, pt may have a week off due to his symptoms and having to wear the Zio patch monitor  Xochitl Bojorquez will call the pt to advise

## 2022-07-21 NOTE — TELEPHONE ENCOUNTER
Dr Dylan Myrick, can you please review the info from [facility] about pt asking if he should place the Zio patch on, and if he should return to work? 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

## 2022-07-21 NOTE — TELEPHONE ENCOUNTER
Wife called and is asking if he is authorized to place the zio patch on  Also should he go back to work? He did get a note to be out of work due to bradycardia        Thank you

## 2022-07-28 ENCOUNTER — HOSPITAL ENCOUNTER (OUTPATIENT)
Dept: NON INVASIVE DIAGNOSTICS | Facility: CLINIC | Age: 27
Discharge: HOME | End: 2022-07-28
Payer: COMMERCIAL

## 2022-07-28 VITALS
HEART RATE: 65 BPM | SYSTOLIC BLOOD PRESSURE: 100 MMHG | BODY MASS INDEX: 30.81 KG/M2 | WEIGHT: 208 LBS | DIASTOLIC BLOOD PRESSURE: 82 MMHG | HEIGHT: 69 IN

## 2022-07-28 DIAGNOSIS — R00.1 BRADYCARDIA: ICD-10-CM

## 2022-07-28 LAB
AORTIC ROOT: 3.3 CM
APICAL FOUR CHAMBER EJECTION FRACTION: 60 %
ASCENDING AORTA: 3.1 CM
E WAVE DECELERATION TIME: 206 MS
FRACTIONAL SHORTENING: 31 % (ref 28–44)
INTERVENTRICULAR SEPTUM IN DIASTOLE (PARASTERNAL SHORT AXIS VIEW): 1.1 CM
INTERVENTRICULAR SEPTUM: 1.1 CM (ref 0.6–1.1)
LAAS-AP2: 18.6 CM2
LAAS-AP4: 20 CM2
LEFT ATRIUM SIZE: 3.4 CM
LEFT INTERNAL DIMENSION IN SYSTOLE: 3.7 CM (ref 2.1–4)
LEFT VENTRICULAR INTERNAL DIMENSION IN DIASTOLE: 5.4 CM (ref 3.5–6)
LEFT VENTRICULAR POSTERIOR WALL IN END DIASTOLE: 0.9 CM
LEFT VENTRICULAR STROKE VOLUME: 86 ML
LVSV (TEICH): 86 ML
MV E'TISSUE VEL-SEP: 18 CM/S
MV PEAK A VEL: 0.71 M/S
MV PEAK E VEL: 73 CM/S
MV STENOSIS PRESSURE HALF TIME: 60 MS
MV VALVE AREA P 1/2 METHOD: 3.67 CM2
RIGHT ATRIUM AREA SYSTOLE A4C: 19.2 CM2
RIGHT VENTRICLE ID DIMENSION: 4 CM
SL CV LEFT ATRIUM LENGTH A2C: 5 CM
SL CV LV EF: 60
SL CV PED ECHO LEFT VENTRICLE DIASTOLIC VOLUME (MOD BIPLANE) 2D: 144 ML
SL CV PED ECHO LEFT VENTRICLE SYSTOLIC VOLUME (MOD BIPLANE) 2D: 58 ML
TR MAX PG: 23 MMHG
TR PEAK VELOCITY: 2.4 M/S
TRICUSPID VALVE PEAK REGURGITATION VELOCITY: 2.38 M/S

## 2022-07-28 PROCEDURE — 93306 TTE W/DOPPLER COMPLETE: CPT

## 2022-07-28 PROCEDURE — 93306 TTE W/DOPPLER COMPLETE: CPT | Performed by: INTERNAL MEDICINE

## 2022-07-29 ENCOUNTER — TELEPHONE (OUTPATIENT)
Dept: CARDIOLOGY CLINIC | Facility: CLINIC | Age: 27
End: 2022-07-29

## 2022-07-29 NOTE — TELEPHONE ENCOUNTER
Spoke with patient about normal echo results per Dr Britni Bowman  Patient's wife requesting patient stay an extra week off from work due to still wearing the heart monitor and luisana symptoms (SOB, lightheaded, dizzy)  Letter is in patient's chart

## 2022-07-29 NOTE — TELEPHONE ENCOUNTER
----- Message from Lida Ball MD sent at 7/28/2022  5:21 PM EDT -----  Please call the patient about normal ECHO results  Thank you

## 2022-08-10 ENCOUNTER — CLINICAL SUPPORT (OUTPATIENT)
Dept: CARDIOLOGY CLINIC | Facility: CLINIC | Age: 27
End: 2022-08-10
Payer: COMMERCIAL

## 2022-08-10 DIAGNOSIS — R00.1 BRADYCARDIA: ICD-10-CM

## 2022-08-10 PROCEDURE — 93248 EXT ECG>7D<15D REV&INTERPJ: CPT | Performed by: INTERNAL MEDICINE

## 2022-08-12 ENCOUNTER — TELEPHONE (OUTPATIENT)
Dept: CARDIOLOGY CLINIC | Facility: CLINIC | Age: 27
End: 2022-08-12

## 2022-08-12 NOTE — TELEPHONE ENCOUNTER
Wife called and would like to know zio results and if pt can go back to work on Monday      Thank you

## 2022-08-12 NOTE — TELEPHONE ENCOUNTER
Spoke to patient and wife    Chelsea Coulter patch results showed sinus rhythm throughout the study  Patient had sinus bradycardia to 39 beats per minute however this was only for brief time  Average heart rate was in 70s and patient did achieve sinus tachycardia up to 160 beats per minute  Do not currently see any evidence of sinus node dysfunction  Patient has dizziness/lightheadedness could be due to orthostatics  His bradycardia at rest might be due to high vagal tone and or possibly from continuous marijuana use  Recommend patient avoid marijuana and used test stockings  He can restart exercise  If he continues to have bradycardia we can repeat another patch while he is at work

## 2022-11-22 ENCOUNTER — PATIENT MESSAGE (OUTPATIENT)
Dept: INTERNAL MEDICINE CLINIC | Age: 27
End: 2022-11-22

## 2023-03-03 ENCOUNTER — TELEPHONE (OUTPATIENT)
Dept: INTERNAL MEDICINE CLINIC | Facility: CLINIC | Age: 28
End: 2023-03-03

## 2023-03-03 NOTE — TELEPHONE ENCOUNTER
Patient now seeing [facility] primary care, records in [place]      Please remove as pcp

## 2023-03-30 ENCOUNTER — TELEPHONE (OUTPATIENT)
Dept: INTERNAL MEDICINE CLINIC | Facility: OTHER | Age: 28
End: 2023-03-30

## 2023-03-30 ENCOUNTER — TELEPHONE (OUTPATIENT)
Dept: INTERNAL MEDICINE CLINIC | Facility: CLINIC | Age: 28
End: 2023-03-30

## 2023-03-30 NOTE — TELEPHONE ENCOUNTER
Upon review, patient is no longer seeing us for his primary care needs  He now see's [facility]  Records are in epic      Please remove PCP

## 2023-03-31 NOTE — TELEPHONE ENCOUNTER
03/31/23 12:02 PM    New [facility] pcp office messaged to update PCP field       Thank you  Jose Antonio Angeles

## 2024-02-24 ENCOUNTER — APPOINTMENT (EMERGENCY)
Dept: CT IMAGING | Facility: HOSPITAL | Age: 29
End: 2024-02-24

## 2024-02-24 ENCOUNTER — HOSPITAL ENCOUNTER (EMERGENCY)
Facility: HOSPITAL | Age: 29
Discharge: HOME | End: 2024-02-24
Attending: EMERGENCY MEDICINE

## 2024-02-24 VITALS
DIASTOLIC BLOOD PRESSURE: 70 MMHG | WEIGHT: 224.87 LBS | TEMPERATURE: 97.4 F | BODY MASS INDEX: 33.21 KG/M2 | HEART RATE: 56 BPM | RESPIRATION RATE: 18 BRPM | SYSTOLIC BLOOD PRESSURE: 145 MMHG | OXYGEN SATURATION: 98 %

## 2024-02-24 DIAGNOSIS — R55 VASOVAGAL RESPONSE: ICD-10-CM

## 2024-02-24 DIAGNOSIS — R51.9 ACUTE NONINTRACTABLE HEADACHE, UNSPECIFIED HEADACHE TYPE: Primary | ICD-10-CM

## 2024-02-24 PROCEDURE — 70450 CT HEAD/BRAIN W/O DYE: CPT

## 2024-02-24 PROCEDURE — 99284 EMERGENCY DEPT VISIT MOD MDM: CPT

## 2024-02-24 PROCEDURE — 93005 ELECTROCARDIOGRAM TRACING: CPT

## 2024-02-24 PROCEDURE — 99285 EMERGENCY DEPT VISIT HI MDM: CPT | Performed by: EMERGENCY MEDICINE

## 2024-02-24 NOTE — ED ATTENDING ATTESTATION
2/24/2024  I, Lenore Varma MD, saw and evaluated the patient. I have discussed the patient with the resident/non-physician practitioner and agree with the resident's/non-physician practitioner's findings, Plan of Care, and MDM as documented in the resident's/non-physician practitioner's note, except where noted. All available labs and Radiology studies were reviewed.  I was present for key portions of any procedure(s) performed by the resident/non-physician practitioner and I was immediately available to provide assistance.       At this point I agree with the current assessment done in the Emergency Department.  I have conducted an independent evaluation of this patient a history and physical is as follows:    28-year-old male with a history of headaches presenting for evaluation of headache.  Patient states he has had intermittent headaches for the last 2 years but over the last week has noted increasing frequency and headaches.  Patient states they start on the left side of his head and is a sudden onset throbbing pain associated with tearing of the left eye and intermittent nasal congestion.  Patient states he will experience photophobia and phonophobia with some nausea at that time.  Symptoms last for minutes and then self resolve.  Today, he has had 3 episodes and they are lasting longer, prompting evaluation.  Will sometimes take Excedrin at home and has been prescribed Imitrex but has not taken it recently because patient states he feels worse the next day.  Denies recent head injury, fever, chills, vision changes, paresthesias, focal weakness, upper respiratory symptoms, chest pain, shortness of breath, vomiting, abdominal pain, dysuria.  Has not seen urology.  Has taken Topamax in the past for prophylaxis.  There is a family history of AVM and wife at bedside is requesting neuroimaging.  His wife also states when he has these headache episodes and is having pain, his heart rate drops into  the 30s.  Patient has a history of bradycardia and wife states he will also become bradycardic with heavy lifting.  Patient denies symptoms with bradycardia; he has seen cardiology in the past.  Patient endorses adequate fluid intake at home and minimal caffeine use.  States he is sleeping well.  Wife has been keeping a diary of food and drink associations but has not made a correlation with his headaches.  Patient denies tobacco use.    Please see resident documentation for histories and review of systems.    Exam: Vital signs and nursing notes reviewed  General: Awake, alert, no acute distress  HEENT: Normocephalic, atraumatic, mucous membranes moist, PERRL, EOMI, no nystagmus  Neck: Supple, no meningismus  Heart: Regular rate and rhythm  Lungs: Clear to auscultation bilaterally  Abdomen: Soft, nontender, nondistended  Extremities: No swelling or deformity  Skin: Warm, dry, intact, no rash  Neuro: Cranial nerves III, IV, V, VI, VII, 11, 12 intact.  No dysmetria on finger-to-nose.  Bilateral upper and lower extremity strength is 5/5 with flexion and extension at the shoulder, elbow, wrist, hip, knee, ankle.  Sensation intact and equal throughout the face and all extremities  Psych: Cooperative, appropriate mood and affect    EKG: Reviewed by myself and the resident physician: Sinus bradycardia without evidence of ischemia or malignant dysrhythmia    ED Course  ED Course as of 24 1535   Sat 2024   1437 Patient declines medication at this time for headache.   1451 CT head without contrast  IMPRESSION:     No acute intracranial abnormality.       ED course/medical decision makin-year-old male presenting for evaluation of headaches.  Differential diagnosis includes intracranial hemorrhage, mass lesion, skull fracture, meningitis/encephalitis, primary headache disorder (migraine headache, tension headache, cluster headache), dehydration.  Neurological examination is intact.  No history of trauma.  Low  suspicion for intracranial hemorrhage or skull fracture.  Patient is afebrile without infectious symptoms, and there is no evidence of meningismus.  Low suspicion for meningitis or encephalitis.  Suspect cluster headache as a cause of the patient's symptoms.  Patient's headache resolved on resident evaluation and then he had another episode of headache prior to my evaluation but that rapidly improved without intervention.  Offered analgesia but declined.  Wife is requesting imaging to rule out malignancy; explained that we can perform a screening CT of the head here but an MRI would be necessary to definitively rule out mass lesion.  Also explained that the risk of radiation versus the low yield benefit of the CT scan.  Patient elected to pursue CT imaging, which is negative for acute intracranial abnormality.  Of note, patient's wife is concerned about him experiencing bradycardia.  This appears to be in the context of pain and heavy lifting with increasing intrathoracic pressure.  Suspect increased vagal tone.  EKG was performed which shows sinus bradycardia without evidence of ischemia or malignant dysrhythmia.  Do not feel patient requires further cardiac workup at this time but encouraged to follow-up with cardiology as an outpatient.  At this time, patient is appropriate for discharge home with outpatient follow-up.  Will refer to neurology for further evaluation.  Can continue supportive measures at home, adequate hydration, nutrition, sleep, and Imitrex as needed.  Will also follow-up with PCP.  Return precautions discussed.  Patient and wife are in agreement and understanding of these instructions.    Diagnosis: Headache, bradycardia  Disposition: Discharge

## 2024-02-24 NOTE — ED PROVIDER NOTES
History  Chief Complaint   Patient presents with    Headache     Patient here for eval of HA xmonth , today worsening. Pt reports Chills/N/V/Light sensitivity. Pt reports seeing PCP and started on meds with no relief. Wife reports pt has been bradycardic, going down to 30s. Family Hx: AVM rupture.      HPI    (Delmar Maxwell) Delmar Maxwell is a 28 y.o. male who identifies as male with a significant PMHx of recurrent headaches and seasonal allergies presents to the emergency department on February 24, 2024 for headaches. Pain is intermittent, lasting 20 mins usually, longer today, located in L side of head and down L eye. No reported triggers or alleviating factors. No patterns to headaches. Has not taken anything for pain. Associated with photophobia, phonophobia, nausea, L eye tearing, and L nare congestion. Denies vomiting, fever, chills, URI symptoms, sinus symptoms. Denies seizures.    Was referred to see neurologist in the past, but by the time they had an available appointment, headache was resolved.    Patient denies chest pain, LOC, SOB, abdominal pain, weakness/numbness of extremities, bowel or urinary changes, or any other complaint at this time.      Allergies include:  Allergies   Allergen Reactions    Other Itching     Seasonal allergies- eyes and nose itch, stuffy , wheezing         Immunizations:  Immunization History   Administered Date(s) Administered    COVID-19 PFIZER VACCINE 0.3 ML IM 06/06/2021, 06/27/2021    DTaP 1996, 1996, 1996, 06/30/1997, 07/24/2000    HPV Quadrivalent 01/05/2011, 03/28/2011, 09/06/2011    Hep A, ped/adol, 2 dose 02/11/2014    Hep B, Adolescent or Pediatric 1996, 1996, 09/11/2007    HiB 1996, 1996, 1996, 06/30/1997    Hib (HbOC) 1996, 1996, 1996, 06/30/1997    INFLUENZA 11/10/2003, 12/16/2003, 01/05/2011, 02/21/2012, 11/02/2012    IPV 1996, 1996, 1996, 07/24/2000    Influenza Quadrivalent  Preservative Free 3 years and older IM 2016, 2016, 2017    Influenza Split 2012, 10/31/2013    Influenza, seasonal, injectable 2012    MMR 1997, 2000    Meningococcal MCV4P 09/10/2008, 2014    OPV 1996, 1996, 1996    Tdap 2007, 2021    Tuberculin Skin Test-PPD Intradermal 1997, 05/10/1999    Varicella 1997, 2007     Immunizations Reviewed.    Prior to Admission Medications   Prescriptions Last Dose Informant Patient Reported? Taking?   Cholecalciferol (Vitamin D3) 50 MCG (2000 UT) capsule   No No   Sig: Take 1 capsule (2,000 Units total) by mouth daily   SUMAtriptan (Imitrex) 50 mg tablet  Self No No   Sig: Take 1 tablet (50 mg total) by mouth once as needed for migraine for up to 1 dose   fexofenadine (ALLEGRA) 180 MG tablet  Self Yes No   Sig: Take 180 mg by mouth daily   fluticasone (Flonase Sensimist) 27.5 MCG/SPRAY nasal spray  Self Yes No   Si sprays into each nostril daily   montelukast (SINGULAIR) 10 mg tablet   No No   Sig: Take 1 tablet (10 mg total) by mouth daily at bedtime   Patient not taking: No sig reported   topiramate (TOPAMAX) 25 mg sprinkle capsule   No No   Sig: Take 1 capsule (25 mg total) by mouth 2 (two) times a day - start with 1 cap at night for a week and then 1 twice daily   Patient not taking: No sig reported      Facility-Administered Medications: None       Past Medical History:   Diagnosis Date    Irregular heartbeat     Last Assessed 2016       History reviewed. No pertinent surgical history.    Family History   Problem Relation Age of Onset    Anxiety disorder Mother     Coronary artery disease Maternal Grandmother     Lung cancer Maternal Grandmother     Prostate cancer Maternal Grandfather     Thyroid cancer Cousin     Pancreatic cancer Cousin      I have reviewed and agree with the history as documented.    E-Cigarette/Vaping    E-Cigarette Use Never User       E-Cigarette/Vaping Substances    Nicotine No     THC No     CBD No     Flavoring No     Other No     Unknown No      Social History     Tobacco Use    Smoking status: Never    Smokeless tobacco: Never    Tobacco comments:     non smoker   Vaping Use    Vaping status: Never Used   Substance Use Topics    Alcohol use: Yes     Comment: very rare    Drug use: Never        Review of Systems   Constitutional:  Negative for activity change, fever and unexpected weight change.   HENT:  Positive for congestion (L nare). Negative for sore throat.    Respiratory:  Negative for cough, chest tightness and shortness of breath.    Cardiovascular:  Negative for chest pain and palpitations.   Gastrointestinal:  Positive for nausea. Negative for abdominal pain, diarrhea and vomiting.   Genitourinary:  Negative for dysuria and hematuria.   Skin:  Negative for wound.   Allergic/Immunologic: Negative for immunocompromised state.   Neurological:  Positive for headaches. Negative for syncope and weakness.   All other systems reviewed and are negative.      Physical Exam  ED Triage Vitals [02/24/24 1251]   Temperature Pulse Respirations Blood Pressure SpO2   (!) 97.4 °F (36.3 °C) 56 18 145/70 98 %      Temp Source Heart Rate Source Patient Position - Orthostatic VS BP Location FiO2 (%)   Oral Monitor Sitting Left arm --      Pain Score       3             Orthostatic Vital Signs  Vitals:    02/24/24 1251   BP: 145/70   Pulse: 56   Patient Position - Orthostatic VS: Sitting       Physical Exam  Vitals and nursing note reviewed.   Constitutional:       General: He is not in acute distress.     Appearance: Normal appearance. He is not ill-appearing.   HENT:      Head: Normocephalic and atraumatic.      Nose: Nose normal.   Eyes:      Extraocular Movements: Extraocular movements intact.      Conjunctiva/sclera: Conjunctivae normal.      Pupils: Pupils are equal, round, and reactive to light.   Cardiovascular:      Rate and Rhythm: Normal  rate and regular rhythm.      Pulses: Normal pulses.      Heart sounds: No murmur heard.     No friction rub. No gallop.   Pulmonary:      Effort: Pulmonary effort is normal. No respiratory distress.      Breath sounds: Normal breath sounds.   Abdominal:      General: Bowel sounds are normal.      Palpations: Abdomen is soft.      Tenderness: There is no abdominal tenderness.   Musculoskeletal:         General: No swelling or tenderness. Normal range of motion.      Cervical back: Normal range of motion. No rigidity or tenderness.   Skin:     General: Skin is warm and dry.      Capillary Refill: Capillary refill takes less than 2 seconds.   Neurological:      Mental Status: He is alert and oriented to person, place, and time.      Cranial Nerves: No cranial nerve deficit.      Sensory: No sensory deficit.      Motor: No weakness.      Comments: No visual field deficits, denies changes in visual acuity  Pupils PERRLA, EOM intact  Sensation intact and equal in upper, middle, and lower face  Able to open mouth fully, no TMJ tenderness, able to puff out cheeks, able keep eyes closed through tension  No facial droop or dysarthria  Able to hear finger rub equally b/l  Able to stick out tongue and move in both directions  No uvular deviation  Able to turn head and shrug shoulders against resistance  Finger to nose and heel to shin normal.   5/5 strength in BLE and BLE, neurovascularly intact.  Able to ambulate on balls of feet and heels without any difficulty.           ED Medications  Medications - No data to display    Diagnostic Studies  Results Reviewed       None                   CT head without contrast   Final Result by Clay Majano MD (02/24 1450)      No acute intracranial abnormality.                  Workstation performed: [id number]               Procedures  ECG 12 Lead Documentation Only    Date/Time: 2/24/2024 3:10 AM    Performed by: Jeanine Lopez MD  Authorized by: Jeanine Lopez MD    ECG reviewed by me,  the ED Provider: yes    Patient location:  ED  Previous ECG:     Previous ECG:  Compared to current    Comparison ECG info:  7/17/2021    Comparison to cardiac monitor: Yes    Rate:     ECG rate:  53    ECG rate assessment: bradycardic    Rhythm:     Rhythm: sinus bradycardia    Ectopy:     Ectopy: none    QRS:     QRS axis:  Normal    QRS intervals:  Normal  Conduction:     Conduction: normal    ST segments:     ST segments:  Normal  T waves:     T waves: normal          ED Course  ED Course as of 02/24/24 1603   Sat Feb 24, 2024   1427 DDx including but not limited to: tension headache, cluster headache, migraine, ICH, SAH, tumor, meningitis, sinusitis.     1427 Based on history and exam, low supicion for ICH, SAH, meningitis, or sinusitis. His symptoms sound like a cluster headache and has resolved at this time. Will CT head to r/o malignancy per request of pt and family, but low suspicion.   1442 Wife also reports bradycardia when he leans over with sharp pains down into the 30s. He also has bradycardia when he picks up heavy objects in the past. No LOC or lightheadedness with these episodes and they last a few seconds. Suspect these are due to his vagal tone and he has seen a cardiologist in the past. Will recommend following up with cardiology regarding bradycardia but no workup indicated at this time.   1444 Pending CT head   1505 CT head without contrast  No acute intracranial abnormality.   1510 No acute findings on CT and no further workup indicated at this time. His headache is resolved. He will need to follow up with neurology and cardiology for his bradycardia.    1515 Discussed results with patient and plan for discharge with outpatient follow up. Instructed patient to take Excedrin as needed and to return to ED for new or worsening symptoms. Patient voices understanding and agrees with plan. No other concerns at this time.                                         Medical Decision Making  Amount and/or  Complexity of Data Reviewed  Radiology: ordered. Decision-making details documented in ED Course.        See ED course for MDM.    Disposition  Final diagnoses:   Acute nonintractable headache, unspecified headache type   Vasovagal response     Time reflects when diagnosis was documented in both MDM as applicable and the Disposition within this note       Time User Action Codes Description Comment    2/24/2024  2:25 PM Jeanine Lopez Add [R51.9] Acute nonintractable headache, unspecified headache type     2/24/2024  2:25 PM Ojhn, Jeanine Add [R55] Vasovagal near syncope     2/24/2024  2:25 PM John, Jeanine Remove [R55] Vasovagal near syncope     2/24/2024  2:25 PM John, Jeanine Add [R55] Vasovagal response           ED Disposition       ED Disposition   Discharge    Condition   Stable    Date/Time   Sat Feb 24, 2024  3:14 PM    Comment   Delmar Maxwell discharge to home/self care.                   Follow-up Information       Follow up With Specialties Details Why Contact Info    Chey Leon, DO Internal Medicine In 2 days  [address] [address]  [phone]              Discharge Medication List as of 2/24/2024  3:14 PM        CONTINUE these medications which have NOT CHANGED    Details   Cholecalciferol (Vitamin D3) 50 MCG (2000 UT) capsule Take 1 capsule (2,000 Units total) by mouth daily, Starting Wed 7/13/2022, Normal      fexofenadine (ALLEGRA) 180 MG tablet Take 180 mg by mouth daily, Historical Med      fluticasone (Flonase Sensimist) 27.5 MCG/SPRAY nasal spray 2 sprays into each nostril daily, Historical Med      montelukast (SINGULAIR) 10 mg tablet Take 1 tablet (10 mg total) by mouth daily at bedtime, Starting Tue 6/14/2022, Normal      SUMAtriptan (Imitrex) 50 mg tablet Take 1 tablet (50 mg total) by mouth once as needed for migraine for up to 1 dose, Starting Thu 7/7/2022, Normal      topiramate (TOPAMAX) 25 mg sprinkle capsule Take 1 capsule (25 mg total) by mouth 2 (two)  times a day - start with 1 cap at night for a week and then 1 twice daily, Starting Thu 7/7/2022, Normal               PDMP Review       None             ED Provider  Attending physically available and evaluated Delmar Maxwell. I managed the patient along with the ED Attending.    Electronically Signed by           Jeanine Lopez MD  02/24/24 8710

## 2024-02-25 LAB
ATRIAL RATE: 53 BPM
P AXIS: 69 DEGREES
PR INTERVAL: 158 MS
QRS AXIS: 82 DEGREES
QRSD INTERVAL: 100 MS
QT INTERVAL: 426 MS
QTC INTERVAL: 399 MS
T WAVE AXIS: 74 DEGREES
VENTRICULAR RATE: 53 BPM

## 2024-02-25 PROCEDURE — 93010 ELECTROCARDIOGRAM REPORT: CPT | Performed by: INTERNAL MEDICINE

## 2025-02-18 ENCOUNTER — TELEPHONE (OUTPATIENT)
Dept: INTERNAL MEDICINE CLINIC | Age: 30
End: 2025-02-18